# Patient Record
Sex: FEMALE | Race: WHITE | NOT HISPANIC OR LATINO | Employment: OTHER | URBAN - METROPOLITAN AREA
[De-identification: names, ages, dates, MRNs, and addresses within clinical notes are randomized per-mention and may not be internally consistent; named-entity substitution may affect disease eponyms.]

---

## 2023-06-01 ENCOUNTER — TELEPHONE (OUTPATIENT)
Dept: PRIMARY CARE | Facility: CLINIC | Age: 63
End: 2023-06-01
Payer: MEDICARE

## 2023-06-01 NOTE — TELEPHONE ENCOUNTER
Pt is calling because she states she was told to call back when she needed refills on her meds    Rx Refill Request Telephone Encounter    Name:  Gemini Butler  : 1960     Medication Name:  atorvastatin  Dose (Optional):    40mg  Quantity (Optional):    #90  Directions (Optional):   take 1 tablet by mouth every day     ALLERGIES:   nkda    Specific Pharmacy location:  [facility]    Date of last appointment:  22  Date of next appointment:  none    Best number to reach patient:  [phone]    Rx Refill Request Telephone Encounter    Name:  Gemini Butler  : 1960     Medication Name:  omeprazole  Dose (Optional):    40 mg(not 20 mg)  Quantity (Optional):    #90  Directions (Optional):   take 1 capsule by mouth daily    ALLERGIES:   nkda     Specific Pharmacy location:  [facility]    Date of last appointment:  22  Date of next appointment:  none    Best number to reach patient:  [phone]

## 2023-06-02 NOTE — TELEPHONE ENCOUNTER
Pt made appointment for 6/7/23. She was driving so she will call back to give us information on a local pharmacy she needs a refill sent to

## 2023-06-06 PROBLEM — E06.3 HYPOTHYROIDISM DUE TO HASHIMOTO'S THYROIDITIS: Status: ACTIVE | Noted: 2023-06-06

## 2023-06-06 PROBLEM — E89.2 S/P PARATHYROIDECTOMY (CMS/HCC): Status: ACTIVE | Noted: 2023-06-06

## 2023-06-06 PROBLEM — K21.9 REFLUX LARYNGITIS: Status: ACTIVE | Noted: 2023-06-06

## 2023-06-06 PROBLEM — E83.52 HYPERCALCEMIA: Status: ACTIVE | Noted: 2023-06-06

## 2023-06-06 PROBLEM — M54.12 RADICULOPATHY, CERVICAL: Status: ACTIVE | Noted: 2023-06-06

## 2023-06-06 PROBLEM — R53.83 FATIGUE: Status: ACTIVE | Noted: 2023-06-06

## 2023-06-06 PROBLEM — R19.7 DIARRHEA: Status: ACTIVE | Noted: 2023-06-06

## 2023-06-06 PROBLEM — M54.2 NECK PAIN, CHRONIC: Status: ACTIVE | Noted: 2023-06-06

## 2023-06-06 PROBLEM — R59.9 LYMPH NODES ENLARGED: Status: ACTIVE | Noted: 2023-06-06

## 2023-06-06 PROBLEM — R12 HEARTBURN: Status: ACTIVE | Noted: 2023-06-06

## 2023-06-06 PROBLEM — R07.0 THROAT PAIN: Status: ACTIVE | Noted: 2023-06-06

## 2023-06-06 PROBLEM — R20.0 NUMBNESS IN BOTH HANDS: Status: ACTIVE | Noted: 2023-06-06

## 2023-06-06 PROBLEM — R06.02 SHORTNESS OF BREATH ON EXERTION: Status: ACTIVE | Noted: 2023-06-06

## 2023-06-06 PROBLEM — M79.7 FIBROMYALGIA: Status: ACTIVE | Noted: 2023-06-06

## 2023-06-06 PROBLEM — D22.9 MELANOCYTIC NEVUS: Status: ACTIVE | Noted: 2023-06-06

## 2023-06-06 PROBLEM — R13.10 DYSPHAGIA: Status: ACTIVE | Noted: 2023-06-06

## 2023-06-06 PROBLEM — R87.612 LOW GRADE SQUAMOUS INTRAEPITHELIAL LESION (LGSIL) AT RISK FOR HIGH GRADE SQUAMOUS INTRAEPITHELIAL LESION (HGSIL) ON CYTOLOGIC SMEAR OF CERVIX: Status: ACTIVE | Noted: 2023-06-06

## 2023-06-06 PROBLEM — L98.9 SKIN DISORDER: Status: ACTIVE | Noted: 2023-06-06

## 2023-06-06 PROBLEM — G89.29 NECK PAIN, CHRONIC: Status: ACTIVE | Noted: 2023-06-06

## 2023-06-06 PROBLEM — N87.0 CIN I (CERVICAL INTRAEPITHELIAL NEOPLASIA I): Status: ACTIVE | Noted: 2023-06-06

## 2023-06-06 PROBLEM — E21.0 PRIMARY HYPERPARATHYROIDISM (MULTI): Status: ACTIVE | Noted: 2023-06-06

## 2023-06-06 PROBLEM — J39.2 THROAT IRRITATION: Status: ACTIVE | Noted: 2023-06-06

## 2023-06-06 PROBLEM — J04.0 REFLUX LARYNGITIS: Status: ACTIVE | Noted: 2023-06-06

## 2023-06-06 PROBLEM — K59.00 CONSTIPATION: Status: ACTIVE | Noted: 2023-06-06

## 2023-06-06 PROBLEM — G62.9 PERIPHERAL POLYNEUROPATHY: Status: ACTIVE | Noted: 2023-06-06

## 2023-06-06 PROBLEM — B97.7 HPV IN FEMALE: Status: ACTIVE | Noted: 2023-06-06

## 2023-06-06 PROBLEM — E03.8 HYPOTHYROIDISM DUE TO HASHIMOTO'S THYROIDITIS: Status: ACTIVE | Noted: 2023-06-06

## 2023-06-06 PROBLEM — R20.9 SENSORY DISTURBANCE: Status: ACTIVE | Noted: 2023-06-06

## 2023-06-06 PROBLEM — E78.5 HYPERLIPIDEMIA: Status: ACTIVE | Noted: 2023-06-06

## 2023-06-06 PROBLEM — I49.9 ARRHYTHMIA: Status: ACTIVE | Noted: 2023-06-06

## 2023-06-06 PROBLEM — E55.9 VITAMIN D DEFICIENCY: Status: ACTIVE | Noted: 2023-06-06

## 2023-06-06 PROBLEM — N95.1 HOT FLASHES, MENOPAUSAL: Status: ACTIVE | Noted: 2023-06-06

## 2023-06-06 PROBLEM — K64.9 HEMORRHOIDS: Status: ACTIVE | Noted: 2023-06-06

## 2023-06-06 RX ORDER — ERGOCALCIFEROL 1.25 MG/1
1 CAPSULE ORAL
COMMUNITY
Start: 2018-08-13 | End: 2023-12-13 | Stop reason: WASHOUT

## 2023-06-06 RX ORDER — DULOXETIN HYDROCHLORIDE 60 MG/1
CAPSULE, DELAYED RELEASE ORAL
COMMUNITY
End: 2023-12-13 | Stop reason: WASHOUT

## 2023-06-06 RX ORDER — ATORVASTATIN CALCIUM 40 MG/1
TABLET, FILM COATED ORAL
COMMUNITY
Start: 2023-02-25 | End: 2024-06-06 | Stop reason: SDUPTHER

## 2023-06-06 RX ORDER — LEVOTHYROXINE SODIUM 75 UG/1
TABLET ORAL
COMMUNITY
Start: 2023-02-25 | End: 2023-08-31 | Stop reason: SDUPTHER

## 2023-06-06 RX ORDER — ALBUTEROL SULFATE 90 UG/1
AEROSOL, METERED RESPIRATORY (INHALATION)
COMMUNITY
Start: 2022-11-22 | End: 2023-12-13 | Stop reason: WASHOUT

## 2023-06-06 RX ORDER — ROSUVASTATIN CALCIUM 10 MG/1
TABLET, COATED ORAL
COMMUNITY
Start: 2022-07-27 | End: 2023-12-13 | Stop reason: WASHOUT

## 2023-06-06 RX ORDER — ACETAMINOPHEN 500 MG
1 TABLET ORAL DAILY
COMMUNITY

## 2023-06-06 RX ORDER — OMEPRAZOLE 20 MG/1
CAPSULE, DELAYED RELEASE ORAL
COMMUNITY
Start: 2023-03-30

## 2023-06-07 ENCOUNTER — OFFICE VISIT (OUTPATIENT)
Dept: PRIMARY CARE | Facility: CLINIC | Age: 63
End: 2023-06-07
Payer: MEDICARE

## 2023-06-07 VITALS
DIASTOLIC BLOOD PRESSURE: 78 MMHG | RESPIRATION RATE: 16 BRPM | WEIGHT: 178.6 LBS | BODY MASS INDEX: 28.7 KG/M2 | HEIGHT: 66 IN | SYSTOLIC BLOOD PRESSURE: 120 MMHG | OXYGEN SATURATION: 96 % | HEART RATE: 67 BPM | TEMPERATURE: 97.2 F

## 2023-06-07 DIAGNOSIS — E78.2 MIXED HYPERLIPIDEMIA: ICD-10-CM

## 2023-06-07 DIAGNOSIS — K21.9 GASTROESOPHAGEAL REFLUX DISEASE WITHOUT ESOPHAGITIS: ICD-10-CM

## 2023-06-07 DIAGNOSIS — E06.3 HYPOTHYROIDISM DUE TO HASHIMOTO'S THYROIDITIS: ICD-10-CM

## 2023-06-07 DIAGNOSIS — E03.8 HYPOTHYROIDISM DUE TO HASHIMOTO'S THYROIDITIS: ICD-10-CM

## 2023-06-07 DIAGNOSIS — Z00.00 MEDICARE ANNUAL WELLNESS VISIT, SUBSEQUENT: Primary | ICD-10-CM

## 2023-06-07 PROCEDURE — 1159F MED LIST DOCD IN RCRD: CPT | Performed by: FAMILY MEDICINE

## 2023-06-07 PROCEDURE — G0439 PPPS, SUBSEQ VISIT: HCPCS | Performed by: FAMILY MEDICINE

## 2023-06-07 PROCEDURE — 1170F FXNL STATUS ASSESSED: CPT | Performed by: FAMILY MEDICINE

## 2023-06-07 PROCEDURE — 1036F TOBACCO NON-USER: CPT | Performed by: FAMILY MEDICINE

## 2023-06-07 RX ORDER — HYDROCORTISONE 1 %
GEL (GRAM) TOPICAL
COMMUNITY

## 2023-06-07 ASSESSMENT — PATIENT HEALTH QUESTIONNAIRE - PHQ9
SUM OF ALL RESPONSES TO PHQ9 QUESTIONS 1 AND 2: 0
2. FEELING DOWN, DEPRESSED OR HOPELESS: NOT AT ALL
1. LITTLE INTEREST OR PLEASURE IN DOING THINGS: NOT AT ALL

## 2023-06-07 ASSESSMENT — ACTIVITIES OF DAILY LIVING (ADL)
DRESSING: INDEPENDENT
BATHING: INDEPENDENT
GROCERY_SHOPPING: INDEPENDENT
DOING_HOUSEWORK: INDEPENDENT
TAKING_MEDICATION: INDEPENDENT
MANAGING_FINANCES: INDEPENDENT

## 2023-06-07 ASSESSMENT — ENCOUNTER SYMPTOMS
DEPRESSION: 0
LOSS OF SENSATION IN FEET: 0
OCCASIONAL FEELINGS OF UNSTEADINESS: 0

## 2023-06-07 NOTE — PROGRESS NOTES
"Subjective   Patient ID: Gemini Butler is a 63 y.o. female who presents for Medicare Annual Wellness Visit Subsequent, Hyperlipidemia, and Hypothyroidism.    HPI    AWV    Hypothyroidism  Taking Synthroid 75 mcg  Taking on an empty stomach  No significant weight loss/gain  No heat/cold intolerances  No increase in hair loss/dry skin  She does notice a difference if she doesn't take this regularly.    Hyperlipidemia follow up  Denies chest pain,SOB  Denies any swelling, headaches, lightheadedness or dizziness  Eats a generally healthy diet  Exercises  Currently taking Lipitor.    She is taking Omeprazole also.  Her acid has been well controlled with 20 mg.    No other concern      Review of systems  ; Patient seen today for exam denies any problems with headaches or vision, denies any shortness of breath chest pain nausea or vomiting, no black stool no blood in the stool no heartburn type symptoms denies any problems with constipation or diarrhea, and no dysuria-type symptoms    The patient's allergies medications were reviewed with them today    The patient's social family and surgical history or also reviewed here today, along with her past medical history.     Objective     Alert and active in  no acute distress  HEENT TMs clear oropharynx negative nares clear no drainage noted neck supple  With no adenopathy   Heart regular rate and rhythm without murmur and no carotid bruits  Lungs- clear to auscultation bilaterally, no wheeze or rhonchi noted  Thyroid -negative masses or nodularity  Abdomen- soft times four quadrants , bowel sounds positive no masses or organomegaly, negative tenderness guarding or rebound  Neurological exam unremarkable- DTRs in upper and lower extremities within normal limits.   skin -no lesions noted      /78 (BP Location: Right arm, Patient Position: Sitting, BP Cuff Size: Adult)   Pulse 67   Temp 36.2 °C (97.2 °F) (Temporal)   Resp 16   Ht 1.676 m (5' 6\")   Wt 81 kg (178 lb " 9.6 oz)   SpO2 96%   BMI 28.83 kg/m²     No Known Allergies    Assessment/Plan   Problem List Items Addressed This Visit          Endocrine/Metabolic    Hypothyroidism due to Hashimoto's thyroiditis       Other    Hyperlipidemia     Other Visit Diagnoses       Medicare annual wellness visit, subsequent    -  Primary    Gastroesophageal reflux disease without esophagitis              Medicare wellness questionnaire reviewed in detail. Advanced Directives reviewed today. Patient advised to provide the office with a copy if has not already done so. No problems with activities of daily living. Falls risks reviewed.    She can take CoQ10 if she would like to.    Discussed Cologuard with her today.    Discussed Prevnar 20 with her today.  Refuses Prevnar 20 at this time.    If anything worsens or changes please call us at once, follow up in the office as planned.    Scribe Attestation  By signing my name below, I, Inna Campbell MA, Scribe   attest that this documentation has been prepared under the direction and in the presence of Deo Martinez DO.

## 2023-08-31 DIAGNOSIS — E03.8 HYPOTHYROIDISM DUE TO HASHIMOTO'S THYROIDITIS: ICD-10-CM

## 2023-08-31 DIAGNOSIS — E06.3 HYPOTHYROIDISM DUE TO HASHIMOTO'S THYROIDITIS: ICD-10-CM

## 2023-08-31 RX ORDER — LEVOTHYROXINE SODIUM 75 UG/1
75 TABLET ORAL
Qty: 90 TABLET | Refills: 1 | Status: SHIPPED | OUTPATIENT
Start: 2023-08-31 | End: 2023-12-13 | Stop reason: SDUPTHER

## 2023-08-31 NOTE — TELEPHONE ENCOUNTER
PATIENT IS CALLING FOR A REFILL ON:     LEVOTHYROXINE 75 MCG - 1 TABLET DAILY    [facility]    [phone]    LAST OV: 6/7/23

## 2023-11-15 ENCOUNTER — TELEPHONE (OUTPATIENT)
Dept: PRIMARY CARE | Facility: CLINIC | Age: 63
End: 2023-11-15
Payer: MEDICARE

## 2023-11-15 NOTE — TELEPHONE ENCOUNTER
Rx Refill Request Telephone Encounter    Name:  Gemini Butler  : 1960     Medication Name:  levothyroxine (Synthroid) 75 mcg tablet   Quantity (Optional):    90 tablet   Directions (Optional):   Take 1 tablet (75 mcg) by mouth once daily in the morning. Take before meals.     Specific Pharmacy location:    [facility] [place]       Date of last appointment:  23

## 2023-11-15 NOTE — TELEPHONE ENCOUNTER
We can send a 30 day locally, but she is due for her 6 month with Dr Martinez.    Please assist pt to schedule this and thank you!

## 2023-12-12 NOTE — PROGRESS NOTES
Subjective   Patient ID: Gemini Butler is a 63 y.o. female who presents for Hyperlipidemia, Hypothyroidism, Med Refill, and Anxiety.  HPI  Hypothyroidism  Taking Synthroid 75 mcg  Taking on an empty stomach  No significant weight loss/gain  No heat/cold intolerances  No increase in hair loss/dry skin  She does notice a difference if she doesn't take this regularly.     Hyperlipidemia follow up  Denies chest pain, SOB at times  Denies any swelling, headaches, lightheadedness or dizziness  Eats a generally healthy diet  Exercises  Currently taking Lipitor.     Patient admits to experiencing increased anxiety at times  Patient states this is episodic and it feels like a panic attack  She states when this happens her body feels overwhelmed and feels a shock throughout her body     She is taking Omeprazole also.  Her acid has been well controlled with 20 mg.    Flu declined     No other concern  Review of systems  ; Patient seen today for exam denies any problems with headaches or vision, denies any shortness of breath chest pain nausea or vomiting, no black stool no blood in the stool no heartburn type symptoms denies any problems with constipation or diarrhea, and no dysuria-type symptoms    The patient's allergies medications were reviewed with them today    The patient's social family and surgical history or also reviewed here today, along with her past medical history.     Objective     Alert and active in  no acute distress  HEENT TMs clear oropharynx negative nares clear no drainage noted neck supple  With no adenopathy   Heart regular rate and rhythm without murmur and no carotid bruits  Lungs- clear to auscultation bilaterally, no wheeze or rhonchi noted  Thyroid -negative masses or nodularity  Abdomen- soft times four quadrants , bowel sounds positive no masses or organomegaly, negative tenderness guarding or rebound  Neurological exam unremarkable- DTRs in upper and lower extremities within normal limits.  "  skin -no lesions noted      /72 (BP Location: Left arm, Patient Position: Sitting, BP Cuff Size: Adult)   Pulse 63   Temp 36.4 °C (97.5 °F) (Temporal)   Ht 1.702 m (5' 7\")   Wt 79.5 kg (175 lb 3.2 oz)   SpO2 98%   BMI 27.44 kg/m²     No Known Allergies    Assessment/Plan   Problem List Items Addressed This Visit       Arrhythmia    Hyperlipidemia    Relevant Orders    CBC and Auto Differential    Comprehensive Metabolic Panel    Lipid Panel    Hypothyroidism due to Hashimoto's thyroiditis    Relevant Medications    levothyroxine (Synthroid) 75 mcg tablet    Other Relevant Orders    Thyroid Stimulating Hormone    Thyroxine, Free    Primary hyperparathyroidism (CMS/HCC)    Shortness of breath on exertion    Vitamin D deficiency    Relevant Orders    Vitamin D 25-Hydroxy,Total (for eval of Vitamin D levels)     Other Visit Diagnoses       Encounter for screening mammogram for malignant neoplasm of breast        Relevant Orders    BI mammo bilateral screening tomosynthesis    Screen for colon cancer        Relevant Orders    Cologuard® colon cancer screening              If anything worsens or changes please call us at once, follow up in the office as planned,     "

## 2023-12-13 ENCOUNTER — APPOINTMENT (OUTPATIENT)
Dept: LAB | Facility: LAB | Age: 63
End: 2023-12-13
Payer: MEDICARE

## 2023-12-13 ENCOUNTER — OFFICE VISIT (OUTPATIENT)
Dept: PRIMARY CARE | Facility: CLINIC | Age: 63
End: 2023-12-13
Payer: MEDICARE

## 2023-12-13 VITALS
TEMPERATURE: 97.5 F | BODY MASS INDEX: 27.5 KG/M2 | HEIGHT: 67 IN | OXYGEN SATURATION: 98 % | WEIGHT: 175.2 LBS | HEART RATE: 63 BPM | DIASTOLIC BLOOD PRESSURE: 72 MMHG | SYSTOLIC BLOOD PRESSURE: 124 MMHG

## 2023-12-13 DIAGNOSIS — E03.8 HYPOTHYROIDISM DUE TO HASHIMOTO'S THYROIDITIS: ICD-10-CM

## 2023-12-13 DIAGNOSIS — E06.3 HYPOTHYROIDISM DUE TO HASHIMOTO'S THYROIDITIS: ICD-10-CM

## 2023-12-13 DIAGNOSIS — E78.2 MIXED HYPERLIPIDEMIA: ICD-10-CM

## 2023-12-13 DIAGNOSIS — I49.9 CARDIAC ARRHYTHMIA, UNSPECIFIED CARDIAC ARRHYTHMIA TYPE: ICD-10-CM

## 2023-12-13 DIAGNOSIS — E55.9 VITAMIN D DEFICIENCY: ICD-10-CM

## 2023-12-13 DIAGNOSIS — Z12.31 ENCOUNTER FOR SCREENING MAMMOGRAM FOR MALIGNANT NEOPLASM OF BREAST: ICD-10-CM

## 2023-12-13 DIAGNOSIS — E21.0 PRIMARY HYPERPARATHYROIDISM (MULTI): ICD-10-CM

## 2023-12-13 DIAGNOSIS — Z12.11 SCREEN FOR COLON CANCER: ICD-10-CM

## 2023-12-13 DIAGNOSIS — R06.02 SHORTNESS OF BREATH ON EXERTION: ICD-10-CM

## 2023-12-13 PROCEDURE — 1159F MED LIST DOCD IN RCRD: CPT | Performed by: FAMILY MEDICINE

## 2023-12-13 PROCEDURE — 1036F TOBACCO NON-USER: CPT | Performed by: FAMILY MEDICINE

## 2023-12-13 PROCEDURE — 99214 OFFICE O/P EST MOD 30 MIN: CPT | Performed by: FAMILY MEDICINE

## 2023-12-13 RX ORDER — MV-MIN/VIT C/GLUT/LYSINE/HB124 250-12.5MG
TABLET,CHEWABLE ORAL
COMMUNITY

## 2023-12-13 RX ORDER — LEVOTHYROXINE SODIUM 75 UG/1
75 TABLET ORAL
Qty: 90 TABLET | Refills: 1 | Status: SHIPPED | OUTPATIENT
Start: 2023-12-13 | End: 2024-06-06 | Stop reason: SDUPTHER

## 2023-12-13 NOTE — PROGRESS NOTES
Subjective   Patient ID: Gemini Butler is a 63 y.o. female who presents for Hyperlipidemia, Hypothyroidism, Med Refill, and Anxiety.    Hyperlipidemia    Med Refill    Anxiety        Doing well with her thyroid needs refill today is watching her cholesterol closely      At times her anxiety gets worse but we discussed that this time she will hold off on any medications as she dislikes to have fun others due to psych to find out the day sometimes  Hypothyroidism  Taking Synthroid 75 mcg  Taking on an empty stomach  No significant weight loss/gain  No heat/cold intolerances  No increase in hair loss/dry skin  She does notice a difference if she doesn't take this regularly.     Hyperlipidemia follow up  Denies chest pain, SOB at times  Denies any swelling, headaches, lightheadedness or dizziness  Eats a generally healthy diet  Exercises  Currently taking Lipitor.     Patient admits to experiencing increased anxiety at times  Patient states this is episodic and it feels like a panic attack  She states when this happens her body feels overwhelmed and feels a shock throughout her body     She is taking Omeprazole also.  Her acid has been well controlled with 20 mg.  Has been taking it 4 days weekly.    Flu declined     No other concern  Review of systems  ; Patient seen today for exam denies any problems with headaches or vision, denies any shortness of breath chest pain nausea or vomiting, no black stool no blood in the stool no heartburn type symptoms denies any problems with constipation or diarrhea, and no dysuria-type symptoms    The patient's allergies medications were reviewed with them today    The patient's social family and surgical history or also reviewed here today, along with her past medical history.     Objective     Alert and active in  no acute distress  HEENT TMs clear oropharynx negative nares clear no drainage noted neck supple  With no adenopathy   Heart regular rate and rhythm without murmur and  "no carotid bruits  Lungs- clear to auscultation bilaterally, no wheeze or rhonchi noted  Thyroid -negative masses or nodularity  Abdomen- soft times four quadrants , bowel sounds positive no masses or organomegaly, negative tenderness guarding or rebound  Neurological exam unremarkable- DTRs in upper and lower extremities within normal limits.   skin -no lesions noted      /72 (BP Location: Left arm, Patient Position: Sitting, BP Cuff Size: Adult)   Pulse 63   Temp 36.4 °C (97.5 °F) (Temporal)   Ht 1.702 m (5' 7\")   Wt 79.5 kg (175 lb 3.2 oz)   SpO2 98%   BMI 27.44 kg/m²     No Known Allergies    Assessment/Plan   Problem List Items Addressed This Visit       Arrhythmia    Hyperlipidemia    Relevant Orders    CBC and Auto Differential    Comprehensive Metabolic Panel    Lipid Panel    Hypothyroidism due to Hashimoto's thyroiditis    Relevant Medications    levothyroxine (Synthroid) 75 mcg tablet    Other Relevant Orders    Thyroid Stimulating Hormone    Thyroxine, Free    Primary hyperparathyroidism (CMS/HCC)    Shortness of breath on exertion    Vitamin D deficiency    Relevant Orders    Vitamin D 25-Hydroxy,Total (for eval of Vitamin D levels)     Other Visit Diagnoses       Encounter for screening mammogram for malignant neoplasm of breast        Relevant Orders    BI mammo bilateral screening tomosynthesis    Screen for colon cancer        Relevant Orders    Cologuard® colon cancer screening          Refill Levothyroxine.     Mammogram ordered.    Refused Prevnar 20 AGAINST MEDICAL ADVICE.    Cologuard ordered.    Recommend a multivitamin daily.    If her anxiety is causing her problems with her relationships, her friendships, her work, then we recommend she start a medication.    Labs have been ordered, she/he will have these performed and we will contact her/him with results.  (CBC, CMP, Lipid, TSH, T4, Vitamin D)    If anything worsens or changes please call us at once, follow up in the office as " planned.    Scribe Attestation  By signing my name below, I, Inna Campbell MA , Scribtrina   attest that this documentation has been prepared under the direction and in the presence of Deo Martinez DO.

## 2023-12-14 ENCOUNTER — LAB (OUTPATIENT)
Dept: LAB | Facility: LAB | Age: 63
End: 2023-12-14
Payer: MEDICARE

## 2023-12-14 DIAGNOSIS — E06.3 HYPOTHYROIDISM DUE TO HASHIMOTO'S THYROIDITIS: ICD-10-CM

## 2023-12-14 DIAGNOSIS — E03.8 HYPOTHYROIDISM DUE TO HASHIMOTO'S THYROIDITIS: ICD-10-CM

## 2023-12-14 DIAGNOSIS — E78.2 MIXED HYPERLIPIDEMIA: ICD-10-CM

## 2023-12-14 DIAGNOSIS — E55.9 VITAMIN D DEFICIENCY: ICD-10-CM

## 2023-12-14 LAB
25(OH)D3 SERPL-MCNC: 29 NG/ML (ref 30–100)
ALBUMIN SERPL BCP-MCNC: 4.1 G/DL (ref 3.4–5)
ALP SERPL-CCNC: 76 U/L (ref 33–136)
ALT SERPL W P-5'-P-CCNC: 17 U/L (ref 7–45)
ANION GAP SERPL CALC-SCNC: 11 MMOL/L (ref 10–20)
AST SERPL W P-5'-P-CCNC: 17 U/L (ref 9–39)
BASOPHILS # BLD AUTO: 0.07 X10*3/UL (ref 0–0.1)
BASOPHILS NFR BLD AUTO: 1.3 %
BILIRUB SERPL-MCNC: 0.4 MG/DL (ref 0–1.2)
BUN SERPL-MCNC: 15 MG/DL (ref 6–23)
CALCIUM SERPL-MCNC: 9 MG/DL (ref 8.6–10.3)
CHLORIDE SERPL-SCNC: 104 MMOL/L (ref 98–107)
CHOLEST SERPL-MCNC: 156 MG/DL (ref 0–199)
CHOLESTEROL/HDL RATIO: 3.2
CO2 SERPL-SCNC: 29 MMOL/L (ref 21–32)
CREAT SERPL-MCNC: 0.83 MG/DL (ref 0.5–1.05)
EOSINOPHIL # BLD AUTO: 0.15 X10*3/UL (ref 0–0.7)
EOSINOPHIL NFR BLD AUTO: 2.7 %
ERYTHROCYTE [DISTWIDTH] IN BLOOD BY AUTOMATED COUNT: 11.7 % (ref 11.5–14.5)
GFR SERPL CREATININE-BSD FRML MDRD: 78 ML/MIN/1.73M*2
GLUCOSE SERPL-MCNC: 92 MG/DL (ref 74–99)
HCT VFR BLD AUTO: 40.6 % (ref 36–46)
HDLC SERPL-MCNC: 49 MG/DL
HGB BLD-MCNC: 13.3 G/DL (ref 12–16)
IMM GRANULOCYTES # BLD AUTO: 0.01 X10*3/UL (ref 0–0.7)
IMM GRANULOCYTES NFR BLD AUTO: 0.2 % (ref 0–0.9)
LDLC SERPL CALC-MCNC: 77 MG/DL
LYMPHOCYTES # BLD AUTO: 2.7 X10*3/UL (ref 1.2–4.8)
LYMPHOCYTES NFR BLD AUTO: 49.5 %
MCH RBC QN AUTO: 30.6 PG (ref 26–34)
MCHC RBC AUTO-ENTMCNC: 32.8 G/DL (ref 32–36)
MCV RBC AUTO: 94 FL (ref 80–100)
MONOCYTES # BLD AUTO: 0.44 X10*3/UL (ref 0.1–1)
MONOCYTES NFR BLD AUTO: 8.1 %
NEUTROPHILS # BLD AUTO: 2.09 X10*3/UL (ref 1.2–7.7)
NEUTROPHILS NFR BLD AUTO: 38.2 %
NON HDL CHOLESTEROL: 107 MG/DL (ref 0–149)
NRBC BLD-RTO: 0 /100 WBCS (ref 0–0)
PLATELET # BLD AUTO: 244 X10*3/UL (ref 150–450)
POTASSIUM SERPL-SCNC: 4.1 MMOL/L (ref 3.5–5.3)
PROT SERPL-MCNC: 6.6 G/DL (ref 6.4–8.2)
RBC # BLD AUTO: 4.34 X10*6/UL (ref 4–5.2)
SODIUM SERPL-SCNC: 140 MMOL/L (ref 136–145)
T4 FREE SERPL-MCNC: 0.83 NG/DL (ref 0.61–1.12)
TRIGL SERPL-MCNC: 149 MG/DL (ref 0–149)
TSH SERPL-ACNC: 2.18 MIU/L (ref 0.44–3.98)
VLDL: 30 MG/DL (ref 0–40)
WBC # BLD AUTO: 5.5 X10*3/UL (ref 4.4–11.3)

## 2023-12-14 PROCEDURE — 82306 VITAMIN D 25 HYDROXY: CPT

## 2023-12-14 PROCEDURE — 84439 ASSAY OF FREE THYROXINE: CPT

## 2023-12-14 PROCEDURE — 80053 COMPREHEN METABOLIC PANEL: CPT

## 2023-12-14 PROCEDURE — 85025 COMPLETE CBC W/AUTO DIFF WBC: CPT

## 2023-12-14 PROCEDURE — 84443 ASSAY THYROID STIM HORMONE: CPT

## 2023-12-14 PROCEDURE — 80061 LIPID PANEL: CPT

## 2023-12-14 PROCEDURE — 36415 COLL VENOUS BLD VENIPUNCTURE: CPT

## 2023-12-18 ENCOUNTER — TELEPHONE (OUTPATIENT)
Dept: PRIMARY CARE | Facility: CLINIC | Age: 63
End: 2023-12-18
Payer: MEDICARE

## 2023-12-18 NOTE — TELEPHONE ENCOUNTER
----- Message from Deo Martinez DO sent at 12/15/2023  5:54 PM EST -----  Her labs all look great including cholesterol and sugar vitamin D is slightly low make sure she is taking at least 2000 vitamin D3 each day if she is then she needs to take 3000/day, increase it by 50%

## 2024-01-02 LAB — NONINV COLON CA DNA+OCC BLD SCRN STL QL: NEGATIVE

## 2024-01-03 ENCOUNTER — TELEPHONE (OUTPATIENT)
Dept: PRIMARY CARE | Facility: CLINIC | Age: 64
End: 2024-01-03
Payer: MEDICARE

## 2024-01-03 NOTE — TELEPHONE ENCOUNTER
----- Message from Deo Martinez DO sent at 1/3/2024  7:45 AM EST -----  Cologuard stool test is negative, repeat in 3 years

## 2024-06-06 ENCOUNTER — TELEPHONE (OUTPATIENT)
Dept: PRIMARY CARE | Facility: CLINIC | Age: 64
End: 2024-06-06

## 2024-06-06 DIAGNOSIS — E03.8 HYPOTHYROIDISM DUE TO HASHIMOTO'S THYROIDITIS: ICD-10-CM

## 2024-06-06 DIAGNOSIS — E78.2 MIXED HYPERLIPIDEMIA: ICD-10-CM

## 2024-06-06 DIAGNOSIS — K21.9 GASTROESOPHAGEAL REFLUX DISEASE WITHOUT ESOPHAGITIS: ICD-10-CM

## 2024-06-06 DIAGNOSIS — E06.3 HYPOTHYROIDISM DUE TO HASHIMOTO'S THYROIDITIS: ICD-10-CM

## 2024-06-06 RX ORDER — OMEPRAZOLE 40 MG/1
40 CAPSULE, DELAYED RELEASE ORAL
Qty: 90 CAPSULE | Refills: 1 | Status: SHIPPED | OUTPATIENT
Start: 2024-06-06

## 2024-06-06 RX ORDER — LEVOTHYROXINE SODIUM 75 UG/1
75 TABLET ORAL
Qty: 90 TABLET | Refills: 1 | Status: SHIPPED | OUTPATIENT
Start: 2024-06-06

## 2024-06-06 RX ORDER — ATORVASTATIN CALCIUM 40 MG/1
TABLET, FILM COATED ORAL
Qty: 90 TABLET | Refills: 1 | Status: SHIPPED | OUTPATIENT
Start: 2024-06-06

## 2024-06-06 NOTE — TELEPHONE ENCOUNTER
Medication(s) pended for Dr. Martinez    Patient is requesting Omeprazole to be 40 mg 1 every day in place of 20 mg bid     Patient is requesting a year of refills. Please advise

## 2024-07-05 ENCOUNTER — APPOINTMENT (OUTPATIENT)
Dept: PRIMARY CARE | Facility: CLINIC | Age: 64
End: 2024-07-05
Payer: MEDICARE

## 2024-07-11 ENCOUNTER — APPOINTMENT (OUTPATIENT)
Dept: PRIMARY CARE | Facility: CLINIC | Age: 64
End: 2024-07-11
Payer: MEDICARE

## 2024-07-11 VITALS
RESPIRATION RATE: 16 BRPM | TEMPERATURE: 96.9 F | WEIGHT: 177 LBS | HEIGHT: 67 IN | DIASTOLIC BLOOD PRESSURE: 72 MMHG | HEART RATE: 78 BPM | OXYGEN SATURATION: 98 % | SYSTOLIC BLOOD PRESSURE: 116 MMHG | BODY MASS INDEX: 27.78 KG/M2

## 2024-07-11 DIAGNOSIS — E06.3 HYPOTHYROIDISM DUE TO HASHIMOTO'S THYROIDITIS: ICD-10-CM

## 2024-07-11 DIAGNOSIS — E03.8 HYPOTHYROIDISM DUE TO HASHIMOTO'S THYROIDITIS: ICD-10-CM

## 2024-07-11 DIAGNOSIS — E21.0 PRIMARY HYPERPARATHYROIDISM (MULTI): ICD-10-CM

## 2024-07-11 DIAGNOSIS — R21 RASH: ICD-10-CM

## 2024-07-11 DIAGNOSIS — Z00.00 MEDICARE ANNUAL WELLNESS VISIT, SUBSEQUENT: Primary | ICD-10-CM

## 2024-07-11 DIAGNOSIS — E78.2 MIXED HYPERLIPIDEMIA: ICD-10-CM

## 2024-07-11 DIAGNOSIS — R06.02 SOB (SHORTNESS OF BREATH): ICD-10-CM

## 2024-07-11 DIAGNOSIS — M79.7 FIBROMYALGIA: ICD-10-CM

## 2024-07-11 DIAGNOSIS — E55.9 VITAMIN D DEFICIENCY: ICD-10-CM

## 2024-07-11 DIAGNOSIS — R12 HEARTBURN: ICD-10-CM

## 2024-07-11 PROCEDURE — 1159F MED LIST DOCD IN RCRD: CPT | Performed by: FAMILY MEDICINE

## 2024-07-11 PROCEDURE — 1157F ADVNC CARE PLAN IN RCRD: CPT | Performed by: FAMILY MEDICINE

## 2024-07-11 PROCEDURE — 1160F RVW MEDS BY RX/DR IN RCRD: CPT | Performed by: FAMILY MEDICINE

## 2024-07-11 PROCEDURE — 99213 OFFICE O/P EST LOW 20 MIN: CPT | Performed by: FAMILY MEDICINE

## 2024-07-11 PROCEDURE — 1036F TOBACCO NON-USER: CPT | Performed by: FAMILY MEDICINE

## 2024-07-11 PROCEDURE — 1158F ADVNC CARE PLAN TLK DOCD: CPT | Performed by: FAMILY MEDICINE

## 2024-07-11 PROCEDURE — 3008F BODY MASS INDEX DOCD: CPT | Performed by: FAMILY MEDICINE

## 2024-07-11 PROCEDURE — G0439 PPPS, SUBSEQ VISIT: HCPCS | Performed by: FAMILY MEDICINE

## 2024-07-11 PROCEDURE — 1123F ACP DISCUSS/DSCN MKR DOCD: CPT | Performed by: FAMILY MEDICINE

## 2024-07-11 PROCEDURE — 1170F FXNL STATUS ASSESSED: CPT | Performed by: FAMILY MEDICINE

## 2024-07-11 RX ORDER — HYDROCORTISONE 25 MG/G
CREAM TOPICAL 2 TIMES DAILY PRN
Qty: 20 G | Refills: 1 | Status: CANCELLED | OUTPATIENT
Start: 2024-07-11

## 2024-07-11 ASSESSMENT — ACTIVITIES OF DAILY LIVING (ADL)
DRESSING: INDEPENDENT
TAKING_MEDICATION: INDEPENDENT
BATHING: INDEPENDENT
GROCERY_SHOPPING: INDEPENDENT
DOING_HOUSEWORK: INDEPENDENT
MANAGING_FINANCES: INDEPENDENT

## 2024-07-11 ASSESSMENT — PATIENT HEALTH QUESTIONNAIRE - PHQ9
SUM OF ALL RESPONSES TO PHQ9 QUESTIONS 1 AND 2: 0
1. LITTLE INTEREST OR PLEASURE IN DOING THINGS: NOT AT ALL
1. LITTLE INTEREST OR PLEASURE IN DOING THINGS: NOT AT ALL
SUM OF ALL RESPONSES TO PHQ9 QUESTIONS 1 AND 2: 0
2. FEELING DOWN, DEPRESSED OR HOPELESS: NOT AT ALL
2. FEELING DOWN, DEPRESSED OR HOPELESS: NOT AT ALL

## 2024-07-11 NOTE — PROGRESS NOTES
Subjective   Patient ID: Gemini Butler is a 64 y.o. female who presents for Medicare Annual Wellness Visit Subsequent, Hyperlipidemia, and Rash.  HPI  Patient presents today for a Medicare AWV, and a follow-up on Hyperlipidemia. Does follow a low fat diet. Exercises. Blood work was done on 12-14-23.    Pt refuse Prevnar vaccine today     Has been getting hot, and cold. Has noticed a rash on her abdomen. No known exposure to anything different. Has been eating mushroom supplements.   Noticed it 3 days ago. States it is tender. Feels there may be some swelling.  Has been getting a lot of indigestion.  Takes RX Omeprazole daily.  In the past was seen by Dr. Rosario Aden.  Will have her go back and see Dr. Aden    States she still has ongoing issues with SOB.  We recommend a chest x-ray at your convenience, no chest pain she is deftly can be related to food she drinks too much coffee does not eat as well she should you know recommended highly to her    No arm pain no shoulder pain no neck pain nothing suggestive of heart disease      Also has refused to do mammograms in the past recommended highly to her      Has no other new problem /question.    Advanced Care Planning  Gemini Butler and I discussed their advance care plan preferences such as living will, and durable health care power of , which include: no Attempt Resuscitation, yes Intubate & Ventilate, yes Comfort Care or Life- Sustaning Care. I reminded patient to talk with their health care agent, daughter- Marleny Orellana, about their health care goals. Note reflects patient's free will and care goals as expressed to me.     Review of systems  ; Patient seen today for exam denies any problems with headaches or vision, denies any shortness of breath chest pain nausea or vomiting, no black stool no blood in the stool no heartburn type symptoms denies any problems with constipation or diarrhea, and no dysuria-type symptoms    The patient's  "allergies medications were reviewed with them today    The patient's social family and surgical history or also reviewed here today, along with her past medical history.     Objective     Alert and active in  no acute distress  HEENT TMs clear oropharynx negative nares clear no drainage noted neck supple  With no adenopathy   Heart regular rate and rhythm without murmur and no carotid bruits  Lungs- clear to auscultation bilaterally, no wheeze or rhonchi noted  Thyroid -negative masses or nodularity  Abdomen- soft times four quadrants , bowel sounds positive no masses or organomegaly, negative tenderness guarding or rebound  Neurological exam unremarkable- DTRs in upper and lower extremities within normal limits.   skin -rash underneath left rib consistent with contact dermatitis no signs of shingles pain or vesicles      /72 (BP Location: Right arm, Patient Position: Sitting, BP Cuff Size: Adult)   Pulse 78   Temp 36.1 °C (96.9 °F) (Temporal)   Resp 16   Ht 1.702 m (5' 7\")   Wt 80.3 kg (177 lb)   LMP  (LMP Unknown)   SpO2 98%   BMI 27.72 kg/m²     No Known Allergies    Assessment/Plan   Problem List Items Addressed This Visit       Heartburn    Relevant Orders    Referral to Gastroenterology    Hyperlipidemia    Hypothyroidism due to Hashimoto's thyroiditis    Fibromyalgia    Primary hyperparathyroidism (Multi)    Vitamin D deficiency    BMI 27.0-27.9,adult     Other Visit Diagnoses       Medicare annual wellness visit, subsequent    -  Primary    Rash        SOB (shortness of breath)        Relevant Orders    XR chest 2 views            Medicare wellness questionnaire reviewed in detail. Advanced Directives reviewed today. Patient advised to provide the office with a copy if has not already done so. No problems with activities of daily living.  Falls risks reviewed.     Long talk. Treatment options reviewed.  BP controlled. Feels thyroid is doing okay.  Importance of healthy diet and regular exercise " regimen discussed.  Rash discussed. Start Hydrocortisone Cream. RX suggested. Patient wishes to by OTC.  Eat less greasy fried foods.  SOB discussed.  Will refer to GI.    Continue and take your medications as prescribed.    Health Maintenance issues discussed.  Reminded to do Mammogram that was ordered on 12-13-23.  Pneumonia vaccine recommended, and declined. Cologuard is up-to-date.    If anything worsens or changes please call us at once. Follow up in the office as planned.      Scribe Attestation  By signing my name below, I, Vanessa VINCENT, Scribe   attest that this documentation has been prepared under the direction and in the presence of Deo Martinez DO.

## 2024-10-23 ENCOUNTER — TELEPHONE (OUTPATIENT)
Dept: PRIMARY CARE | Facility: CLINIC | Age: 64
End: 2024-10-23
Payer: MEDICARE

## 2024-10-23 NOTE — TELEPHONE ENCOUNTER
Patient calling wants to know if prior authorization has been approved for levothyroxine 75mcg from [facility] insurance [employer] [id number]. Please call [phone]

## 2024-10-28 ENCOUNTER — LAB (OUTPATIENT)
Dept: LAB | Facility: LAB | Age: 64
End: 2024-10-28
Payer: MEDICARE

## 2024-10-28 ENCOUNTER — OFFICE VISIT (OUTPATIENT)
Dept: GASTROENTEROLOGY | Facility: CLINIC | Age: 64
End: 2024-10-28
Payer: MEDICARE

## 2024-10-28 VITALS
TEMPERATURE: 97.3 F | HEART RATE: 80 BPM | SYSTOLIC BLOOD PRESSURE: 137 MMHG | DIASTOLIC BLOOD PRESSURE: 83 MMHG | BODY MASS INDEX: 27.1 KG/M2 | WEIGHT: 173 LBS

## 2024-10-28 DIAGNOSIS — B96.81 H. PYLORI DUODENITIS: Primary | ICD-10-CM

## 2024-10-28 DIAGNOSIS — R12 HEARTBURN: ICD-10-CM

## 2024-10-28 DIAGNOSIS — K29.80 H. PYLORI DUODENITIS: Primary | ICD-10-CM

## 2024-10-28 DIAGNOSIS — B96.81 H. PYLORI DUODENITIS: ICD-10-CM

## 2024-10-28 DIAGNOSIS — E06.3 HYPOTHYROIDISM DUE TO HASHIMOTO'S THYROIDITIS: ICD-10-CM

## 2024-10-28 DIAGNOSIS — K29.80 H. PYLORI DUODENITIS: ICD-10-CM

## 2024-10-28 PROCEDURE — 1036F TOBACCO NON-USER: CPT | Performed by: INTERNAL MEDICINE

## 2024-10-28 PROCEDURE — 83013 H PYLORI (C-13) BREATH: CPT

## 2024-10-28 PROCEDURE — 99213 OFFICE O/P EST LOW 20 MIN: CPT | Performed by: INTERNAL MEDICINE

## 2024-10-28 PROCEDURE — 99203 OFFICE O/P NEW LOW 30 MIN: CPT | Performed by: INTERNAL MEDICINE

## 2024-10-28 PROCEDURE — 1159F MED LIST DOCD IN RCRD: CPT | Performed by: INTERNAL MEDICINE

## 2024-10-28 PROCEDURE — 1157F ADVNC CARE PLAN IN RCRD: CPT | Performed by: INTERNAL MEDICINE

## 2024-10-28 RX ORDER — LEVOTHYROXINE SODIUM 75 UG/1
75 TABLET ORAL
Qty: 90 TABLET | Refills: 0 | Status: SHIPPED | OUTPATIENT
Start: 2024-10-28

## 2024-10-29 LAB — UREA BREATH TEST QL: NEGATIVE

## 2024-11-12 DIAGNOSIS — E78.2 MIXED HYPERLIPIDEMIA: ICD-10-CM

## 2024-11-12 NOTE — TELEPHONE ENCOUNTER
MEDICATION PENDED  Rx Refill Request Telephone Encounter    Name:  Gemini Butler  : 1960     Medication Name:  atorvastatin (Lipitor) 40 mg tablet [[id number]]    Order Details  Dose, Route, Frequency: As Directed   Dispense Quantity: 90 tablet Refills: 1          Sig: TAKE 1 TAB DAILY       ALLERGIES:   nka    Specific Pharmacy location:  [facility], [address] [address]     Date of last appointment:  24  Date of next appointment:      Best number to reach patient:  [phone]

## 2024-11-13 ENCOUNTER — TELEPHONE (OUTPATIENT)
Dept: GASTROENTEROLOGY | Facility: HOSPITAL | Age: 64
End: 2024-11-13
Payer: MEDICARE

## 2024-11-13 RX ORDER — ATORVASTATIN CALCIUM 40 MG/1
TABLET, FILM COATED ORAL
Qty: 90 TABLET | Refills: 0 | Status: SHIPPED | OUTPATIENT
Start: 2024-11-13

## 2024-11-13 NOTE — TELEPHONE ENCOUNTER
Call from patient regarding making payment for office visit. Returned call, there was no answer. Left voicemail with Ignacio's office number so they can assist with co-payment.

## 2025-02-10 ENCOUNTER — HOSPITAL ENCOUNTER (EMERGENCY)
Facility: HOSPITAL | Age: 65
Discharge: HOME | End: 2025-02-10
Attending: STUDENT IN AN ORGANIZED HEALTH CARE EDUCATION/TRAINING PROGRAM
Payer: MEDICARE

## 2025-02-10 ENCOUNTER — APPOINTMENT (OUTPATIENT)
Dept: RADIOLOGY | Facility: HOSPITAL | Age: 65
End: 2025-02-10
Payer: MEDICARE

## 2025-02-10 ENCOUNTER — TELEPHONE (OUTPATIENT)
Dept: PRIMARY CARE | Facility: CLINIC | Age: 65
End: 2025-02-10
Payer: MEDICARE

## 2025-02-10 ENCOUNTER — APPOINTMENT (OUTPATIENT)
Dept: CARDIOLOGY | Facility: HOSPITAL | Age: 65
End: 2025-02-10
Payer: MEDICARE

## 2025-02-10 VITALS
SYSTOLIC BLOOD PRESSURE: 143 MMHG | RESPIRATION RATE: 18 BRPM | WEIGHT: 175 LBS | BODY MASS INDEX: 27.47 KG/M2 | HEIGHT: 67 IN | TEMPERATURE: 97.7 F | OXYGEN SATURATION: 100 % | HEART RATE: 78 BPM | DIASTOLIC BLOOD PRESSURE: 74 MMHG

## 2025-02-10 DIAGNOSIS — R55 VASOVAGAL EPISODE: Primary | ICD-10-CM

## 2025-02-10 LAB
ALBUMIN SERPL BCP-MCNC: 4.2 G/DL (ref 3.4–5)
ALP SERPL-CCNC: 83 U/L (ref 33–136)
ALT SERPL W P-5'-P-CCNC: 15 U/L (ref 7–45)
ANION GAP SERPL CALC-SCNC: 13 MMOL/L (ref 10–20)
AST SERPL W P-5'-P-CCNC: 16 U/L (ref 9–39)
BASOPHILS # BLD AUTO: 0.06 X10*3/UL (ref 0–0.1)
BASOPHILS NFR BLD AUTO: 0.8 %
BILIRUB SERPL-MCNC: 0.5 MG/DL (ref 0–1.2)
BUN SERPL-MCNC: 12 MG/DL (ref 6–23)
CALCIUM SERPL-MCNC: 9 MG/DL (ref 8.6–10.3)
CARDIAC TROPONIN I PNL SERPL HS: <3 NG/L (ref 0–13)
CARDIAC TROPONIN I PNL SERPL HS: <3 NG/L (ref 0–13)
CHLORIDE SERPL-SCNC: 103 MMOL/L (ref 98–107)
CO2 SERPL-SCNC: 26 MMOL/L (ref 21–32)
CREAT SERPL-MCNC: 0.8 MG/DL (ref 0.5–1.05)
EGFRCR SERPLBLD CKD-EPI 2021: 81 ML/MIN/1.73M*2
EOSINOPHIL # BLD AUTO: 0.09 X10*3/UL (ref 0–0.7)
EOSINOPHIL NFR BLD AUTO: 1.2 %
ERYTHROCYTE [DISTWIDTH] IN BLOOD BY AUTOMATED COUNT: 11.6 % (ref 11.5–14.5)
GLUCOSE SERPL-MCNC: 104 MG/DL (ref 74–99)
HCT VFR BLD AUTO: 39.4 % (ref 36–46)
HGB BLD-MCNC: 12.8 G/DL (ref 12–16)
IMM GRANULOCYTES # BLD AUTO: 0.02 X10*3/UL (ref 0–0.7)
IMM GRANULOCYTES NFR BLD AUTO: 0.3 % (ref 0–0.9)
LYMPHOCYTES # BLD AUTO: 2.9 X10*3/UL (ref 1.2–4.8)
LYMPHOCYTES NFR BLD AUTO: 38.8 %
MCH RBC QN AUTO: 30.3 PG (ref 26–34)
MCHC RBC AUTO-ENTMCNC: 32.5 G/DL (ref 32–36)
MCV RBC AUTO: 93 FL (ref 80–100)
MONOCYTES # BLD AUTO: 0.49 X10*3/UL (ref 0.1–1)
MONOCYTES NFR BLD AUTO: 6.6 %
NEUTROPHILS # BLD AUTO: 3.92 X10*3/UL (ref 1.2–7.7)
NEUTROPHILS NFR BLD AUTO: 52.3 %
NRBC BLD-RTO: 0 /100 WBCS (ref 0–0)
PLATELET # BLD AUTO: 237 X10*3/UL (ref 150–450)
POTASSIUM SERPL-SCNC: 3.8 MMOL/L (ref 3.5–5.3)
PROT SERPL-MCNC: 7 G/DL (ref 6.4–8.2)
RBC # BLD AUTO: 4.23 X10*6/UL (ref 4–5.2)
SODIUM SERPL-SCNC: 138 MMOL/L (ref 136–145)
WBC # BLD AUTO: 7.5 X10*3/UL (ref 4.4–11.3)

## 2025-02-10 PROCEDURE — 36415 COLL VENOUS BLD VENIPUNCTURE: CPT

## 2025-02-10 PROCEDURE — 85025 COMPLETE CBC W/AUTO DIFF WBC: CPT

## 2025-02-10 PROCEDURE — 71046 X-RAY EXAM CHEST 2 VIEWS: CPT | Performed by: RADIOLOGY

## 2025-02-10 PROCEDURE — 71046 X-RAY EXAM CHEST 2 VIEWS: CPT

## 2025-02-10 PROCEDURE — 93005 ELECTROCARDIOGRAM TRACING: CPT

## 2025-02-10 PROCEDURE — 80053 COMPREHEN METABOLIC PANEL: CPT

## 2025-02-10 PROCEDURE — 99285 EMERGENCY DEPT VISIT HI MDM: CPT | Mod: 25 | Performed by: STUDENT IN AN ORGANIZED HEALTH CARE EDUCATION/TRAINING PROGRAM

## 2025-02-10 PROCEDURE — 84484 ASSAY OF TROPONIN QUANT: CPT

## 2025-02-10 ASSESSMENT — LIFESTYLE VARIABLES
HAVE PEOPLE ANNOYED YOU BY CRITICIZING YOUR DRINKING: NO
TOTAL SCORE: 0
EVER FELT BAD OR GUILTY ABOUT YOUR DRINKING: NO
EVER HAD A DRINK FIRST THING IN THE MORNING TO STEADY YOUR NERVES TO GET RID OF A HANGOVER: NO
HAVE YOU EVER FELT YOU SHOULD CUT DOWN ON YOUR DRINKING: NO

## 2025-02-10 ASSESSMENT — COLUMBIA-SUICIDE SEVERITY RATING SCALE - C-SSRS
6. HAVE YOU EVER DONE ANYTHING, STARTED TO DO ANYTHING, OR PREPARED TO DO ANYTHING TO END YOUR LIFE?: NO
1. IN THE PAST MONTH, HAVE YOU WISHED YOU WERE DEAD OR WISHED YOU COULD GO TO SLEEP AND NOT WAKE UP?: NO
2. HAVE YOU ACTUALLY HAD ANY THOUGHTS OF KILLING YOURSELF?: NO

## 2025-02-10 ASSESSMENT — PAIN SCALES - GENERAL
PAINLEVEL_OUTOF10: 0 - NO PAIN
PAINLEVEL_OUTOF10: 0 - NO PAIN

## 2025-02-10 ASSESSMENT — PAIN - FUNCTIONAL ASSESSMENT
PAIN_FUNCTIONAL_ASSESSMENT: 0-10
PAIN_FUNCTIONAL_ASSESSMENT: 0-10

## 2025-02-10 NOTE — TELEPHONE ENCOUNTER
Fell at [employer] this morning, she got real hot and fell, she said she almost blacked out, she said she doesn't know if it was a heart attack or stroke, she refused to go to hospital. She wanted to see you today, you have no openings, she is going to go to [facility]. I told her she needs to be checked out especially if it was her heart. She is scheduled to see you Wed. Afternoon. [phone]

## 2025-02-10 NOTE — DISCHARGE INSTRUCTIONS
You were seen in the Emergency Department (ED) for nearly passing out. Your work-up and exam was unimpressive for critical or emergent cause at this time.    Please maintain well and adequate hydration.    Seek immediate medical attention should you have:  fevers of 38C (100.4) or higher, shortness of breath, chest pain, weakness, confusion, vomiting, or any worsening or concerning symptoms.

## 2025-02-10 NOTE — TELEPHONE ENCOUNTER
Deo Martinez,   Do [id number] Clerical1 hour ago (1:15 PM)       This is a note from Cayla she also mention to me that the patient fell, she should be seen to emergency room, I agree thank you very much, looking at her chart looks like she is there, so no need for follow-up at this time

## 2025-02-10 NOTE — ED TRIAGE NOTES
Emergency Department Encounter  [facility] EMERGENCY MEDICINE    Patient: Gemini Butler  MRN: [id number]  : 1960  Date of Evaluation: 2/10/2025  Triage Provider: Dandy Weiss PA-C      Chief Complaint     No chief complaint on file.    [place]    (Location/Symptom, Timing/Onset, Context/Setting, Quality, Duration, Modifying Factors, Severity) Note limiting factors.       Gemini Butler is a 65 y.o. female who presents to the emergency department complaining of near syncope.  Patient states she was at [employer] and felt lightheaded and diaphoretic and had a near syncopal episode.  Patient states she did not completely lose consciousness.  Patient denies falling and hitting her head during the near syncopal episode.  Patient states she is concerned she had a heart attack during this episode.  Patient states her symptoms have currently improved.  Denies current chest pain but states that she has shortness of breath.        Past History     Past Medical History:   Diagnosis Date    Acute upper respiratory infection, unspecified 2018    Viral upper respiratory tract infection with cough    Acute upper respiratory infection, unspecified 2016    Upper respiratory infection, acute    Encounter for full-term uncomplicated delivery      (spontaneous vaginal delivery)    Encounter for general adult medical examination without abnormal findings 2017    Health care maintenance    Encounter for general adult medical examination without abnormal findings 2017    Health care maintenance    Encounter for gynecological examination (general) (routine) without abnormal findings 2018    Visit for gynecologic examination    Encounter for other preprocedural examination     Pre-op testing    Endocrine disorder, unspecified 2016    Elevated parathyroid hormone    Other diseases of pharynx 2016    Throat irritation    Pain in throat 2016    Throat pain    Pain  in unspecified hand 08/13/2018    Hand pain    Personal history of other diseases of the digestive system 02/26/2014    History of constipation    Personal history of other specified conditions 10/16/2019    History of fatigue    Personal history of other specified conditions 04/18/2017    History of diarrhea    Personal history of other specified conditions 03/14/2018    History of dizziness    Personal history of other specified conditions 12/13/2016    History of dysphagia    Personal history of other specified conditions 03/14/2018    History of shortness of breath    Personal history of urinary (tract) infections 04/22/2017    History of urinary tract infection    Postprocedural hypoparathyroidism (Multi) 02/09/2017    S/P parathyroidectomy     Past Surgical History:   Procedure Laterality Date    CT ANGIO CORONARY ART WITH HEARTFLOW IF SCORE >30%  1/8/2021    CT ANGIO CORONARY ART WITH HEARTFLOW IF SCORE >30% 1/8/2021    OTHER SURGICAL HISTORY  11/22/2022    Eye surgery    OTHER SURGICAL HISTORY  11/22/2022    Parathyroidectomy    SEPTOPLASTY  05/10/2016    Septoplasty    TONSILLECTOMY  05/10/2016    Tonsillectomy     Social History     Socioeconomic History    Marital status: Single   Tobacco Use    Smoking status: Never    Smokeless tobacco: Never   Vaping Use    Vaping status: Never Used   Substance and Sexual Activity    Alcohol use: Not Currently    Drug use: Never    Sexual activity: Defer     Social Drivers of Health     Financial Resource Strain: Patient Declined (10/1/2021)    Received from [employer]    Overall Financial Resource Strain (CARDIA)     Difficulty of Paying Living Expenses: Patient declined   Food Insecurity: Patient Declined (10/1/2021)    Received from [employer]    Hunger Vital Sign     Worried About Running Out of Food in the Last Year: Patient declined     Ran Out of Food in the Last Year: Patient declined   Transportation Needs: Patient Declined  (10/1/2021)    Received from [employer]    PRAPARE - Transportation     Lack of Transportation (Medical): Patient declined     Lack of Transportation (Non-Medical): Patient declined   Physical Activity: Sufficiently Active (10/1/2021)    Received from [employer]    Exercise Vital Sign     Days of Exercise per Week: 3 days     Minutes of Exercise per Session: 130 min   Stress: Patient Declined (10/1/2021)    Received from [employer]    [facility] [place] of Occupational [employer] - Occupational Stress Questionnaire     Feeling of Stress : Patient declined   Social Connections: Patient Declined (10/1/2021)    Received from [employer]    Social Connection and Isolation Panel [NHANES]     Frequency of Communication with Friends and Family: Patient declined     Frequency of Social Gatherings with Friends and Family: Patient declined     Attends L[employer] Services: Patient declined     Active Member of Clubs or Organizations: Patient declined     Attends Club or Organization Meetings: Patient declined     Marital Status: Patient declined   Intimate Partner Violence: Patient Declined (10/1/2021)    Received from [employer]    Humiliation, Afraid, Rape, and Kick questionnaire     Fear of Current or Ex-Partner: Patient declined     Emotionally Abused: Patient declined     Physically Abused: Patient declined     Sexually Abused: Patient declined   Housing Stability: Unknown (10/1/2021)    Received from [employer]    Housing Stability Vital Sign     Unable to Pay for Housing in the Last Year: Patient refused     Unstable Housing in the Last Year: Patient refused       Medications/Allergies     Previous Medications    ATORVASTATIN (LIPITOR) 40 MG TABLET    TAKE 1 TAB DAILY    CHOLECALCIFEROL (VITAMIN D-3) 50 MCG (2,000 UNIT) CAPSULE    Take 1 capsule (50 mcg) by mouth once daily.    COD LIVER OIL ORAL    Take by mouth.    CYANOCOBALAMIN-COBAMAMIDE  (B12) 5,000-100 MCG LOZENGE    Place under the tongue.    LEVOTHYROXINE (SYNTHROID) 75 MCG TABLET    Take 1 tablet (75 mcg) by mouth once daily in the morning. Take before meals.    [id number]- ([place], ASCORBATE SODIUM,) 334-1.7 MG TABLET,CHEWABLE    Chew.    OMEPRAZOLE (PRILOSEC) 40 MG DR CAPSULE    Take 1 capsule (40 mg) by mouth once daily in the morning. Take before meals. Do not crush or chew.     No Known Allergies     Physical Exam       ED Triage Vitals   Temp Pulse Resp BP   -- -- -- --      SpO2 Temp src Heart Rate Source Patient Position   -- -- -- --      BP Location FiO2 (%)     -- --         Physical Exam    Focused PE    GENERAL:  The patient appears nourished and normally developed. Vital signs as documented.     PULMONARY:  Lungs are clear to auscultation, without any respiratory distress. Able to speak full sentences, no accessory muscle use    CARDIAC:   Normal rate. No murmurs, rubs or gallops    ABDOMEN:  Soft, non distended, non tender, BS positive x 4 quadrants, No rebound or guarding, no peritoneal signs, may be limited by patient positioning in triage    MUSCULOSKELETAL:   Able to ambulate, Non edematous, with no obvious deformities. Pulses intact distal    SKIN:   Good color, with no significant rashes.  No pallor.    NEURO:  Alert and oriented, speech clear and coherent    Plan   Labs: CBC, CMP, troponins  EKG   imaging: Chest x-ray        For the remainder of the patient's workup and ED course, please see the main ED provider note.  We discussed need for diagnostic testing including lab studies and imaging.  We also discussed that they may be asked to wait in the waiting room while these test are pending.  They understand that if they choose to leave without having the testing completed or resulted that we cannot rule out acute life-threatening illnesses and the risks involved to lead to worsening condition, permanent disability or even death.        Comment: Please  note this report has been produced using speech recognition software and may contain errors related to that system including errors in grammar, punctuation, and spelling, as well as words and phrases that may be inappropriate.  If there are any questions or concerns please feel free to contact the dictating provider for clarification.    Dandy Weiss PA-C

## 2025-02-11 LAB
ATRIAL RATE: 64 BPM
P AXIS: 25 DEGREES
P OFFSET: 196 MS
P ONSET: 145 MS
PR INTERVAL: 152 MS
Q ONSET: 221 MS
QRS COUNT: 10 BEATS
QRS DURATION: 74 MS
QT INTERVAL: 408 MS
QTC CALCULATION(BAZETT): 420 MS
QTC FREDERICIA: 416 MS
R AXIS: 6 DEGREES
T AXIS: 52 DEGREES
T OFFSET: 425 MS
VENTRICULAR RATE: 64 BPM

## 2025-02-11 NOTE — ED PROVIDER NOTES
Emergency Department Provider Note        History of Present Illness     History provided by: Patient  Limitations to History: None  External Records Reviewed with Brief Summary: None    HPI:  Gemini Butler is a 65 y.o. female presenting to the ED with complaint of a near syncopal event.  Patient reports that she had been line dancing today and went to the grocery store in which she felt as if she was going to pass out and had to sit down and subsequently felt hot and had to take off her jacket.  A bystander asked if she was okay and advised she go to the ED.  Says that briefly she felt pain to her coccyx but this went away. Denies chest pain, shortness of breath, nausea, vomiting, urinary symptoms, or diarrhea.    Physical Exam   Triage vitals:  T 36.5 °C (97.7 °F)  HR 75  /80  RR 17  O2 99 % None (Room air)    General: Awake, alert, in no acute distress  Eyes: Gaze conjugate.  No scleral icterus or injection  HENT: Normo-cephalic, atraumatic. No stridor  CV: Regular rate, regular rhythm. Radial pulses 2+ bilaterally  Resp: Breathing non-labored, speaking in full sentences.  Clear to auscultation bilaterally  GI: Soft, non-distended, non-tender. No rebound or guarding.  MSK/Extremities: No gross bony deformities. Moving all extremities  Skin: Warm. Appropriate color  Neuro: Alert. Oriented. Face symmetric. Speech is fluent.  Gross strength and sensation intact in b/l UE and LEs  Psych: Appropriate mood and affect    Medical Decision Making & ED Course   Medical Decision Makin y.o. female evaluated in the ED for near syncopal event following a day of exercising.  Patient is afebrile, sinus, hypertensive, saturating well on room air, and in no acute distress.  On exam patient is well-appearing and has been able to go to and from the restroom without assistance.    Patient's presentation, history, exam is most concerning for vasovagal event however cannot rule out ACS, electrolyte abnormality,  dehydration, or arrhythmia.    In the ED, cardiac workup performed with a 2 view chest x-ray.  Bedside POCUS of the abdominal aorta performed for education purposes and no large aneurysm appreciated.  ----  Differential diagnoses considered include but are not limited to: As documented above in [facility]     Social Determinants of Health which Significantly Impact Care: None identified     EKG Independent Interpretation: Sinus rhythm.  No acute ischemic changes.  Normal intervals.    Independent Result Review and Interpretation: Relevant laboratory and radiographic results were reviewed and independently interpreted by myself.  As necessary, they are commented on in the ED Course.    Chronic conditions affecting the patient's care: As documented above in [facility]    The patient was discussed with the following consultants/services: None    Care Considerations: As documented above in [facility]    ED Course:  ED Course as of 02/10/25 2208   Mon Feb 10, 2025   1457 On my interpretation of labs, results are unimpressive for systemic inflammation or infection, acute anemia or blood loss, SIVAKUMAR, hepatitis, ACS, or electrolyte abnormalities. [ES]   1458 XR chest 2 views  CXR unimpressive for pneumonia, including effusions, infiltrates, or consolidations and no sign of pneumothorax or aortic widening. [ES]   1603 Orthostatic vitals were negative. [ES]      ED Course User Index  [ES] Jairo Patel MD         Diagnoses as of 02/10/25 2208   Vasovagal episode     Disposition   As a result of the work-up, the patient was discharged home.  she was informed of her diagnosis and instructed to come back with any concerns or worsening of condition.  she and was agreeable to the plan as discussed above.  she was given the opportunity to ask questions.  All of the patient's questions were answered.    Procedures   Procedures    Patient seen and discussed with ED attending physician.    Jairo Patel MD  Emergency Medicine     Jairo Patel,  MD  Resident  02/10/25 [id number]

## 2025-02-12 ENCOUNTER — OFFICE VISIT (OUTPATIENT)
Dept: PRIMARY CARE | Facility: CLINIC | Age: 65
End: 2025-02-12
Payer: MEDICARE

## 2025-02-12 DIAGNOSIS — R55 VASO VAGAL EPISODE: ICD-10-CM
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
## 2025-02-12 DIAGNOSIS — Z00.00 ROUTINE ADULT HEALTH MAINTENANCE: ICD-10-CM

## 2025-02-12 DIAGNOSIS — R53.83 MALAISE AND FATIGUE: Primary | ICD-10-CM

## 2025-02-12 DIAGNOSIS — R53.81 MALAISE AND FATIGUE: Primary | ICD-10-CM

## 2025-02-12 PROCEDURE — 1036F TOBACCO NON-USER: CPT | Performed by: FAMILY MEDICINE

## 2025-02-12 PROCEDURE — 1160F RVW MEDS BY RX/DR IN RCRD: CPT | Performed by: FAMILY MEDICINE

## 2025-02-12 PROCEDURE — 1157F ADVNC CARE PLAN IN RCRD: CPT | Performed by: FAMILY MEDICINE

## 2025-02-12 PROCEDURE — 1159F MED LIST DOCD IN RCRD: CPT | Performed by: FAMILY MEDICINE

## 2025-02-12 PROCEDURE — 3008F BODY MASS INDEX DOCD: CPT | Performed by: FAMILY MEDICINE

## 2025-02-12 PROCEDURE — 99214 OFFICE O/P EST MOD 30 MIN: CPT | Performed by: FAMILY MEDICINE

## 2025-02-12 ASSESSMENT — PATIENT HEALTH QUESTIONNAIRE - PHQ9
2. FEELING DOWN, DEPRESSED OR HOPELESS: NOT AT ALL
1. LITTLE INTEREST OR PLEASURE IN DOING THINGS: NOT AT ALL
SUM OF ALL RESPONSES TO PHQ9 QUESTIONS 1 AND 2: 0

## 2025-02-12 NOTE — PROGRESS NOTES
Subjective   Patient ID: Gemini Butler is a 65 y.o. female who presents for Fall.    HPI    Patient presents today for a fall. The fall occurred on Monday. Admits to injury. Denies hitting her head. Patient states she was in the parking lot at a store, and when she was leaving, she got very hot and sweaty and had extreme pelvic pain, down to the back of her legs and she laid down on the floor until someone helped her. She usually eats in the morning but does not remember if she ate that day. Patient states she has tingling in her arm. She was seen in the ER on 2/10/25 for a near syncopal event. EKG done in ER. Patient would like lab work done. She has high cholesterol that is managed with atorvastatin 40 mg daily.    Patient would like answers to what happed to her on Monday. She complaints of jittery nerves and feels it may be fibromyalgia may be involved with all this.  Reviewed her CAT scans with her and blood work        Review of systems  ; Patient seen today for exam denies any problems with headaches or vision, denies any shortness of breath chest pain nausea or vomiting, no black stool no blood in the stool no heartburn type symptoms denies any problems with constipation or diarrhea, and no dysuria-type symptoms    The patient's allergies medications were reviewed with them today    The patient's social family and surgical history or also reviewed here today, along with her past medical history.     Objective     Alert and active in  no acute distress  HEENT TMs clear oropharynx negative nares clear no drainage noted neck supple  With no adenopathy   Heart regular rate and rhythm without murmur and no carotid bruits  Lungs- clear to auscultation bilaterally, no wheeze or rhonchi noted  Thyroid -negative masses or nodularity  Abdomen- soft times four quadrants , bowel sounds positive no masses or organomegaly, negative tenderness guarding or rebound  Neurological exam unremarkable- DTRs in upper and lower  "extremities within normal limits.   skin -no lesions noted      /86 (BP Location: Right arm, Patient Position: Sitting, BP Cuff Size: Adult)   Pulse 74   Temp 36.6 °C (97.8 °F) (Temporal)   Ht 1.702 m (5' 7\")   Wt 80.6 kg (177 lb 12.8 oz)   LMP  (LMP Unknown)   SpO2 98%   BMI 27.85 kg/m²     No Known Allergies    Assessment/Plan   Problem List Items Addressed This Visit       BMI 27.0-27.9,adult     Other Visit Diagnoses       Malaise and fatigue    -  Primary    Relevant Orders    T4, free    TSH    Fall, initial encounter        Routine adult health maintenance        Vaso vagal episode        Relevant Orders    T4, free    TSH            Labs have been ordered, she/he will have these performed and we will contact her/him with results.  (TSH, T4)    Continue all the medications at the prescribed dose.    Eat frequently in small portion.    Drink plenty of fluid and remain hydrated.     Recommended seeing cardiology for cardiac stress test.      If she experiences recurrent near syncopal episodes in the future, we will send her for a heart ultrasound.     If anything worsens or changes please call us at once, follow up in the office as planned,       Scribe Attestation  By signing my name below, ICee, Scribe   attest that this documentation has been prepared under the direction and in the presence of Deo Martinez DO.  "

## 2025-02-13 ENCOUNTER — OFFICE VISIT (OUTPATIENT)
Dept: CARDIOLOGY | Facility: CLINIC | Age: 65
End: 2025-02-13
Payer: MEDICARE

## 2025-02-13 VITALS
SYSTOLIC BLOOD PRESSURE: 130 MMHG | OXYGEN SATURATION: 98 % | BODY MASS INDEX: 27.78 KG/M2 | HEIGHT: 67 IN | DIASTOLIC BLOOD PRESSURE: 80 MMHG | WEIGHT: 177 LBS | HEART RATE: 82 BPM

## 2025-02-13 VITALS
TEMPERATURE: 97.8 F | WEIGHT: 177.8 LBS | DIASTOLIC BLOOD PRESSURE: 86 MMHG | HEART RATE: 74 BPM | SYSTOLIC BLOOD PRESSURE: 138 MMHG | HEIGHT: 67 IN | BODY MASS INDEX: 27.91 KG/M2 | OXYGEN SATURATION: 98 %

## 2025-02-13 DIAGNOSIS — R55 VASOVAGAL EPISODE: ICD-10-CM

## 2025-02-13 DIAGNOSIS — E78.2 MIXED HYPERLIPIDEMIA: Primary | ICD-10-CM

## 2025-02-13 PROCEDURE — 93005 ELECTROCARDIOGRAM TRACING: CPT | Performed by: INTERNAL MEDICINE

## 2025-02-13 PROCEDURE — 1157F ADVNC CARE PLAN IN RCRD: CPT | Performed by: INTERNAL MEDICINE

## 2025-02-13 PROCEDURE — 1159F MED LIST DOCD IN RCRD: CPT | Performed by: INTERNAL MEDICINE

## 2025-02-13 PROCEDURE — 3008F BODY MASS INDEX DOCD: CPT | Performed by: INTERNAL MEDICINE

## 2025-02-13 PROCEDURE — 93010 ELECTROCARDIOGRAM REPORT: CPT | Performed by: INTERNAL MEDICINE

## 2025-02-13 PROCEDURE — 99204 OFFICE O/P NEW MOD 45 MIN: CPT | Performed by: INTERNAL MEDICINE

## 2025-02-13 PROCEDURE — 99214 OFFICE O/P EST MOD 30 MIN: CPT | Mod: 25 | Performed by: INTERNAL MEDICINE

## 2025-02-13 NOTE — PROGRESS NOTES
Name : Gemini Butler    : 1960   MRN : [id number]   ENC Date : 25     Reason for visit: Syncope    Assessment and Plan:  Vasovagal syncope: Patient's description of her event is classic for vasovagal syncope.  Her EKG is normal.  Her exam is normal.  I do not think any further testing is needed.  Dyslipidemia: Patient has 2 coronary calcium scores of 01 in 2018 and another in .  This puts her at very low risk for cardiovascular events.  No need for any change in therapy.  No stress testing is warranted.  I explained the difference between calcium from her parathyroid issues and coronary artery calcification testing.  Pelvic pain: Unclear etiology.  Likelihood that this was some type of muscle cramp or intestinal cramp.  Bedside ultrasound was negative for abdominal aortic aneurysm.  No recurrence of her symptoms.  Given the abrupt nature of it is unlikely to be malignancy related.  If she wants further testing she should follow-up with primary care physician to look at abdominal CT.  Family history of IHSS: Patient has had at least 2 echocardiograms done in the past showing no evidence of IHSS/HOCUM.  The left ventricular septal measurements have always been normal.  The likelihood she would develop the phenotype of IHSS at this age is exceedingly unlikely.  Her EKG and exam is certainly not consistent with IHSS.  I do not think repeat imaging is needed.  Disp: RTO on an as-needed basis      HPI:  Patient is seen today for evaluation of syncopal event that she experienced at a pet store.  She felt excruciating pain in her right pelvic area and probably began to feel faint and needed to lie down.  Fortunately she did not fall and injure herself.  It sounds as if she may have been out for a brief second or 2 and then after recovering developed diaphoresis and a sensation of being cold.  She was taken to the emergency room where she was evaluated.  Laboratories were unremarkable.  Chest x-ray was  unremarkable.  Bedside ultrasound done by ER physician showed no evidence of abdominal aortic aneurysm.  Despite the initial presentation with abdominal discomfort abdominal CT or other abdominal imaging was not done.  She has not had any recurrence of this.  No prior history of syncope.  No family history of sudden cardiac death.      Problem List:   Patient Active Problem List   Diagnosis    Arrhythmia    CLEO I (cervical intraepithelial neoplasia I)    Constipation    Diarrhea    Dysphagia    Fatigue    Heartburn    Hemorrhoids    Hot flashes, menopausal    HPV in female    Hypercalcemia    Hyperlipidemia    Hypothyroidism due to Hashimoto's thyroiditis    Low grade squamous intraepithelial lesion (LGSIL) at risk for high grade squamous intraepithelial lesion (HGSIL) on cytologic smear of cervix    Lymph nodes enlarged    Melanocytic nevus    Fibromyalgia    Neck pain, chronic    Numbness in both hands    Peripheral polyneuropathy    Primary hyperparathyroidism (Multi)    Radiculopathy, cervical    Reflux laryngitis    S/P parathyroidectomy (CMS/HCC)    Sensory disturbance    Shortness of breath on exertion    Skin disorder    Throat irritation    Throat pain    Vitamin D deficiency    BMI 27.0-27.9,adult        Meds:   Current Outpatient Medications on File Prior to Visit   Medication Sig Dispense Refill    atorvastatin (Lipitor) 40 mg tablet TAKE 1 TAB DAILY 90 tablet 0    cholecalciferol (Vitamin D-3) 50 mcg (2,000 unit) capsule Take 1 capsule (50 mcg) by mouth once daily.      COD LIVER OIL ORAL Take by mouth.      cyanocobalamin-cobamamide (B12) 5,000-100 mcg lozenge Place under the tongue.      levothyroxine (Synthroid) 75 mcg tablet Take 1 tablet (75 mcg) by mouth once daily in the morning. Take before meals. 90 tablet 0    [id number] (Airborne, ascorbate sodium,) 334-1.7 mg tablet,chewable Chew.      omeprazole (PriLOSEC) 40 mg DR capsule Take 1 capsule (40 mg) by mouth once daily in  the morning. Take before meals. Do not crush or chew. 90 capsule 1     No current facility-administered medications on file prior to visit.       All: No Known Allergies    Fam Hx:   Family History   Problem Relation Name Age of Onset    Other (IHSS) Mother      Other (cardiac disease) Mother      Diabetes Father      Heart failure Father         Soc Hx:   Social History     Socioeconomic History    Marital status: Single     Spouse name: Not on file    Number of children: Not on file    Years of education: Not on file    Highest education level: Not on file   Occupational History    Not on file   Tobacco Use    Smoking status: Never    Smokeless tobacco: Never   Vaping Use    Vaping status: Never Used   Substance and Sexual Activity    Alcohol use: Not Currently    Drug use: Never    Sexual activity: Defer   Other Topics Concern    Not on file   Social History Narrative    Not on file     Social Drivers of Health     Financial Resource Strain: Patient Declined (10/1/2021)    Received from [employer]    Overall Financial Resource Strain (CARDIA)     Difficulty of Paying Living Expenses: Patient declined   Food Insecurity: Patient Declined (10/1/2021)    Received from [employer]    Hunger Vital Sign     Worried About Running Out of Food in the Last Year: Patient declined     Ran Out of Food in the Last Year: Patient declined   Transportation Needs: Patient Declined (10/1/2021)    Received from [employer]    PRAPARE - Transportation     Lack of Transportation (Medical): Patient declined     Lack of Transportation (Non-Medical): Patient declined   Physical Activity: Sufficiently Active (10/1/2021)    Received from [employer]    Exercise Vital Sign     Days of Exercise per Week: 3 days     Minutes of Exercise per Session: 130 min   Stress: Patient Declined (10/1/2021)    Received from [employer]    [facility] -  "Occupational Stress Questionnaire     Feeling of Stress : Patient declined   Social Connections: Patient Declined (10/1/2021)    Received from [employer]    Social Connection and Isolation Panel [NHANES]     Frequency of Communication with Friends and Family: Patient declined     Frequency of Social Gatherings with Friends and Family: Patient declined     Attends Temple Services: Patient declined     Active Member of Clubs or Organizations: Patient declined     Attends Club or Organization Meetings: Patient declined     Marital Status: Patient declined   Intimate Partner Violence: Patient Declined (10/1/2021)    Received from [employer]    Humiliation, Afraid, Rape, and Kick questionnaire     Fear of Current or Ex-Partner: Patient declined     Emotionally Abused: Patient declined     Physically Abused: Patient declined     Sexually Abused: Patient declined   Housing Stability: Unknown (10/1/2021)    Received from [employer]    Housing Stability Vital Sign     Unable to Pay for Housing in the Last Year: Patient refused     Number of Places Lived in the Last Year: Not on file     Unstable Housing in the Last Year: Patient refused       VS: /80 (BP Location: Right arm, Patient Position: Sitting)   Pulse 82   Ht 1.702 m (5' 7\")   Wt 80.3 kg (177 lb)   LMP  (LMP Unknown)   SpO2 98%   BMI 27.72 kg/m²      Physical Exam  Vitals reviewed.   Constitutional:       Appearance: Normal appearance.   Eyes:      Pupils: Pupils are equal, round, and reactive to light.   Neck:      Vascular: No JVD.   Cardiovascular:      Rate and Rhythm: Normal rate and regular rhythm.      Pulses: Normal pulses.      Heart sounds: No murmur heard.     No gallop.   Pulmonary:      Effort: No respiratory distress.      Breath sounds: No wheezing or rales.   Abdominal:      General: Abdomen is flat. There is no distension.      Palpations: Abdomen is soft.   Musculoskeletal:         General: No " swelling.      Right lower leg: No edema.      Left lower leg: No edema.   Neurological:      General: No focal deficit present.      Mental Status: She is alert.   Psychiatric:         Mood and Affect: Mood normal.            Encounter Date: 02/13/25   ECG 12 lead (Clinic Performed)    Narrative    Normal sinus rhythm.  Normal ECG        ECHO: Results for orders placed in visit on 01/14/16    Echocardiogram    Narrative  [facility], [address] [place] [address]  Tel [phone] and Fax [phone]    TRANSTHORACIC ECHOCARDIOGRAM REPORT      Patient Name:     SIVA GRIMALDO Ordering Physician:  Study Date:       1/14/2016         Reading Physician:   Lázaro Hay MD  MRN/PID:          [id number]          Referring Physician: Ara Frank MD PhD  Accession/Order#: [id number]       PCP:  YOB: 1960          Department Location: [facility]  Gender:           F                 Fellow:  Admission Status: Outpatient        Sonographer:         JANE Marroquin RDCS  Height:           167.64 cm         Additional Staff:  Weight:           80.74 kg          CC Report to:  BSA:              1.90 m2           Study Type:          Echocardiogram  Blood Pressure:   120 /67 mmHg    Diagnosis/ICD: I49.8 Other specified cardiac arrhythmias  Indication:    Arrhythmia  Procedure/CPT: Echo Complete w/Full Doppler ([id number])    Patient History:  Pertinent History: Palpitations, Hyperlipidemia and Dyspnea. Family hx of HCM.    Study Detail: The following Echo studies were performed: 2D, M-Mode, doppler and  color flow. Technically challenging study due to poor acoustic  windows.      PHYSICIAN INTERPRETATION:  Left Ventricle: The left ventricular systolic function is normal, with an estimated ejection fraction of 60-65%. The left ventricular cavity size is decreased. Spectral Doppler shows an impaired relaxation pattern of left ventricular diastolic filling.  Left  Atrium: The left atrium is normal in size.  Right Ventricle: The right ventricle is normal in size. There is normal right ventricular global systolic function.  Right Atrium: The right atrium is normal in size.  Aortic Valve: The aortic valve appears structurally normal. There is no evidence of aortic valve regurgitation. The peak instantaneous gradient of the aortic valve is 7.6 mmHg.  Mitral Valve: The mitral valve is normal in structure. There is no evidence of mitral valve regurgitation.  Tricuspid Valve: The tricuspid valve is structurally normal. No evidence of tricuspid regurgitation.  Pulmonic Valve: The pulmonic valve is structurally normal. There is trace pulmonic valve regurgitation.  Pericardium: There is no pericardial effusion noted.  Aorta: The aortic root is normal.      CONCLUSIONS:  1. The left ventricular systolic function is normal with a 60-65% ejection fraction.  2. Spectral Doppler shows an impaired relaxation pattern of left ventricular diastolic filling.  3. The left atrium is normal in size.  4. The left ventricular cavity size is decreased.    QUANTITATIVE DATA SUMMARY:  2D MEASUREMENTS:  Normal Ranges:  Ao Root:       2.80 cm   (2.0-3.7cm)  LAs:           2.80 cm   (2.7-4.0cm)  IVSd:          1.09 cm   (0.6-1.1cm)  LVPWd:         0.81 cm   (0.6-1.1cm)  LVIDd:         3.65 cm   (3.9-5.9cm)  LVIDs:         2.66 cm  LV Mass Index: 53.8 g/m2  LV % FS        27.1 %    LA VOLUME:  Normal Ranges:  LA Vol A4C:       37.9 ml    (22+/-6mL/m2)  LA Vol Index A4C: 19.9 ml/m2  LA Vol A4C:       36.3 ml    LV DIASTOLIC FUNCTION:  Normal Ranges:  MV Peak E:        0.41 m/s    (0.7-1.2 m/s)  MV Peak A:        0.64 m/s    (0.42-0.7 m/s)  E/A Ratio:        0.64        (1.0-2.2)  MV e'             0.05 m/s    (>8.0)  MV lateral e'     0.05 m/s  MV medial e'      0.05 m/s  MV A Dur:         119.95 msec  E/e' Ratio:       8.21        (<8.0)  PulmV Sys Felipe:    54.77 cm/s  PulmV Avila Felipe:   34.07 cm/s  PulmV S/D  Felipe:    1.61  PulmV A Revs Felipe: 36.52 cm/s  PulmV A Revs Dur: 119.95 msec    MITRAL VALVE:  Normal Ranges:  MV DT: 206 msec (150-240msec)    AORTIC VALVE:  Normal Ranges:  AoV Vmax:      1.38 m/s (<1.7m/s)  AoV Peak P.6 mmHg (<20mmHg)  LVOT Max Felipe:  1.23 m/s (<1.1m/s)  LVOT Diameter: 2.00 cm  (1.8-2.4cm)  AoV Area,Vmax: 2.82 cm2 (2.5-4.5cm2)    RIGHT VENTRICLE:  RV 1   3.3 cm  RV 2   2.2 cm  RV 3   5.6 cm  TAPSE: 16.0 mm  RV s'  0.12 m/s    PULMONIC VALVE:  Normal Ranges:  PV Max Felipe: 0.8 m/s  (0.6-0.9m/s)  PV Max P.8 mmHg    Pulmonary Veins:  PulmV A Revs Dur: 119.95 msec  PulmV A Revs Felipe: 36.52 cm/s  PulmV Avila Felipe:   34.07 cm/s  PulmV S/D Felipe:    1.61  PulmV Sys Felipe:    54.77 cm/s      Lázaro Hay MD  Electronically signed on 2016 at 2:59:33 PM      CT Results:  No results found for this or any previous visit from the past 365 days.        Deejay Dickson MD

## 2025-02-17 ENCOUNTER — TELEPHONE (OUTPATIENT)
Dept: PRIMARY CARE | Facility: CLINIC | Age: 65
End: 2025-02-17
Payer: MEDICARE

## 2025-02-17 DIAGNOSIS — E06.3 HYPOTHYROIDISM DUE TO HASHIMOTO'S THYROIDITIS: ICD-10-CM

## 2025-02-17 DIAGNOSIS — E78.5 HYPERLIPIDEMIA: ICD-10-CM

## 2025-02-17 NOTE — TELEPHONE ENCOUNTER
PATIENT AWARE SHE WILL HAVE TO HAVE THESE DONE AGAIN AND THEY CAN NOT BE DRAWN OFF OF ER BLOOD SPECIMENS

## 2025-02-17 NOTE — TELEPHONE ENCOUNTER
Pt called in stating she was told that the blood draw they did in the emergency room could be used for the labs we put in 2 days later    Please advise   (0) no cry (awake or asleep)/(0) content, relaxed/(0) lying quietly, normal position, moves easily/(0) no particular expression or smile/(0) normal position or relaxed

## 2025-02-18 LAB
T4 FREE SERPL-MCNC: 1.3 NG/DL (ref 0.8–1.8)
TSH SERPL-ACNC: 1.32 MIU/L (ref 0.4–4.5)

## 2025-02-28 DIAGNOSIS — E06.3 HYPOTHYROIDISM DUE TO HASHIMOTO'S THYROIDITIS: ICD-10-CM

## 2025-02-28 DIAGNOSIS — K21.9 GASTROESOPHAGEAL REFLUX DISEASE WITHOUT ESOPHAGITIS: ICD-10-CM

## 2025-02-28 DIAGNOSIS — E78.2 MIXED HYPERLIPIDEMIA: ICD-10-CM

## 2025-02-28 RX ORDER — ATORVASTATIN CALCIUM 40 MG/1
TABLET, FILM COATED ORAL
Qty: 90 TABLET | Refills: 1 | Status: SHIPPED | OUTPATIENT
Start: 2025-02-28

## 2025-02-28 RX ORDER — LEVOTHYROXINE SODIUM 75 UG/1
75 TABLET ORAL
Qty: 90 TABLET | Refills: 1 | Status: SHIPPED | OUTPATIENT
Start: 2025-02-28

## 2025-02-28 RX ORDER — OMEPRAZOLE 40 MG/1
40 CAPSULE, DELAYED RELEASE ORAL
Qty: 90 CAPSULE | Refills: 1 | Status: SHIPPED | OUTPATIENT
Start: 2025-02-28

## 2025-02-28 NOTE — TELEPHONE ENCOUNTER
----- Message from Deo Martinez sent at 2/28/2025  7:41 AM EST -----  Same dose of thyroid, let us know if she needs some sent in to drugstore

## 2025-02-28 NOTE — TELEPHONE ENCOUNTER
Patient aware. Patient would like a lipid panel done. She states this has not been done in awhile. Patient would also like a prescription for Motrin 800 mg.     Patient needs refills on medications. Medications pended

## 2025-03-03 ENCOUNTER — TELEPHONE (OUTPATIENT)
Dept: PRIMARY CARE | Facility: CLINIC | Age: 65
End: 2025-03-03
Payer: MEDICARE

## 2025-03-03 DIAGNOSIS — M25.59 PAIN IN OTHER JOINT: Primary | ICD-10-CM

## 2025-03-03 RX ORDER — IBUPROFEN 600 MG/1
600 TABLET ORAL 4 TIMES DAILY PRN
Qty: 90 TABLET | Refills: 0 | Status: SHIPPED | OUTPATIENT
Start: 2025-03-03 | End: 2026-03-03

## 2025-03-03 NOTE — TELEPHONE ENCOUNTER
They 100s are too much for her height and weight I will send in some 600s but she needs to always take it with food       Patient called back and is aware.

## 2025-03-03 NOTE — TELEPHONE ENCOUNTER
Deo Martinez, DO  You; Ekaterina Jones28 minutes ago (12:51 PM)       They 100s are too much for her height and weight I will send in some 600s but she needs to always take it with food

## 2025-03-03 NOTE — TELEPHONE ENCOUNTER
PATIENT CALLING, SHE WOULD LIKE TO KNOW IF YOU CAN SEND IN A SCRIPT FOR MOTRIN 800 MG TO HER PHARMACY.    PLEASE ADVISE.      [facility]

## 2025-03-05 ENCOUNTER — APPOINTMENT (OUTPATIENT)
Dept: CARDIOLOGY | Facility: CLINIC | Age: 65
End: 2025-03-05
Payer: MEDICARE

## 2025-03-17 ENCOUNTER — TELEPHONE (OUTPATIENT)
Dept: PRIMARY CARE | Facility: CLINIC | Age: 65
End: 2025-03-17
Payer: MEDICARE

## 2025-03-17 DIAGNOSIS — E78.2 MIXED HYPERLIPIDEMIA: ICD-10-CM

## 2025-03-17 NOTE — TELEPHONE ENCOUNTER
Please advise. Lab pended      Gemini Bhandari [id number] [place] [id number] Clinical Support Staff  Phone Number: [phone]     I spoke irasema miranda a HDL LDL lab test, but didn't notice the order in My Chart...Last test was a year ago.  Please add an order for this lab test.  Thank you.

## 2025-03-18 ENCOUNTER — TELEPHONE (OUTPATIENT)
Dept: PRIMARY CARE | Facility: CLINIC | Age: 65
End: 2025-03-18
Payer: MEDICARE

## 2025-03-18 DIAGNOSIS — Z13.220 LIPID SCREENING: ICD-10-CM

## 2025-03-18 NOTE — TELEPHONE ENCOUNTER
PT IS REQUESTING A LIPID PANEL, STATES SHE HAS A HISTORY OF HIGH CHOLESTEROL AND WANTS TO GET THAT CHECKED ASAP     LAST LIPID PANEL 12/14/23   PLEASE ADVISE

## 2025-03-20 ENCOUNTER — TELEPHONE (OUTPATIENT)
Dept: PRIMARY CARE | Facility: CLINIC | Age: 65
End: 2025-03-20
Payer: MEDICARE

## 2025-03-20 LAB
CHOLEST SERPL-MCNC: 201 MG/DL
CHOLEST/HDLC SERPL: 4.3 (CALC)
HDLC SERPL-MCNC: 47 MG/DL
LDLC SERPL CALC-MCNC: 123 MG/DL (CALC)
NONHDLC SERPL-MCNC: 154 MG/DL (CALC)
T4 FREE SERPL-MCNC: 1.3 NG/DL (ref 0.8–1.8)
TRIGL SERPL-MCNC: 195 MG/DL
TSH SERPL-ACNC: 2.34 MIU/L (ref 0.4–4.5)

## 2025-03-20 NOTE — TELEPHONE ENCOUNTER
PATIENT AWARE.     SHE STATES THAT SHE WAS FASTING AND DOES TAKE HER MEDICATION.    SHE WILL BE MORE CAREFUL AND WATCH HER DIET.    DIANDRA

## 2025-03-20 NOTE — TELEPHONE ENCOUNTER
----- Message from Deo Martinez sent at 3/20/2025  3:21 PM EDT -----  Cholesterol parameters are all creeping up, where she fasting when she did this, as she been taking her medicine every day

## 2025-03-25 ENCOUNTER — TELEPHONE (OUTPATIENT)
Dept: PRIMARY CARE | Facility: CLINIC | Age: 65
End: 2025-03-25
Payer: MEDICARE

## 2025-03-25 NOTE — TELEPHONE ENCOUNTER
----- Message from Deo Martinez sent at 3/24/2025  5:22 PM EDT -----  Normal thyroid parameters standing same dose

## 2025-05-20 DIAGNOSIS — E06.3 HYPOTHYROIDISM DUE TO HASHIMOTO'S THYROIDITIS: ICD-10-CM

## 2025-05-20 NOTE — TELEPHONE ENCOUNTER
Rx Refill Request Telephone Encounter    Name:  Gemini Butler  :  [id number]  levothyroxine (Synthroid) 75 mcg tablet [[id number]]    Order Details  Dose: 75 mcg Route: oral Frequency: Daily before breakfast   Dispense Quantity: 90 tablet (90 day supply) Refills: 1    Duration: -- Dispense As Written: No          Sig: Take 1 tablet (75 mcg) by mouth once daily in the morning. Take before meals.     Specific Pharmacy location:  [place] [id number]  Phone: [phone]  Fax: [phone]  TRISTIN #: [id number]   Date of last appointment:  25  Date of next appointment:  na  Best number to reach patient:  [phone]

## 2025-05-21 RX ORDER — LEVOTHYROXINE SODIUM 75 UG/1
75 TABLET ORAL
Qty: 90 TABLET | Refills: 1 | Status: SHIPPED | OUTPATIENT
Start: 2025-05-21

## 2025-07-09 NOTE — PROGRESS NOTES
"Subjective   Patient ID: Gemini Butler is a 65 y.o. female who presents for Medicare Annual Wellness Visit Subsequent, Hyperlipidemia, and Hypothyroidism.  HPI    Patient presents today for AWV,HLD and Hypothyroidism follow up. She does eat a low cholesterol diet. She does not exercise. She does not check her BP at home.  He does take his Synthroid on an empty stomach. Last eye exam was couple years ago. Last dental exam was 3 months ago. No medications changes noted.     Her major complaint today is the pain in her joints, including shoulders and knees. Does stretching exercises. Taking Ibuprofen as needed without relief. Admits to SOB.     Denies alcohol intake. She is a former smoker, 1-2 cigarettes per day.    Due for mammogram.      Review of systems  ; Patient seen today for exam denies any problems with headaches or vision, denies any shortness of breath chest pain nausea or vomiting, no black stool no blood in the stool no heartburn type symptoms denies any problems with constipation or diarrhea, and no dysuria-type symptoms    The patient's allergies medications were reviewed with them today    The patient's social family and surgical history or also reviewed here today, along with her past medical history.     Objective     Alert and active in  no acute distress  HEENT TMs clear oropharynx negative nares clear no drainage noted neck supple  With no adenopathy   Heart regular rate and rhythm without murmur and no carotid bruits  Lungs- clear to auscultation bilaterally, no wheeze or rhonchi noted  Thyroid -negative masses or nodularity  Abdomen- soft times four quadrants , bowel sounds positive no masses or organomegaly, negative tenderness guarding or rebound    skin -no lesions noted      /80 (BP Location: Left arm, Patient Position: Sitting, BP Cuff Size: Adult long)   Pulse 80   Temp 36.6 °C (97.8 °F) (Temporal)   Ht 1.676 m (5' 6\")   Wt 76.2 kg (168 lb)   LMP  (LMP Unknown)   SpO2 95%  "  BMI 27.12 kg/m²         Assessment/Plan   Problem List Items Addressed This Visit       Hyperlipidemia    Hypothyroidism due to Hashimoto's thyroiditis    BMI 27.0-27.9,adult     Other Visit Diagnoses         Medicare annual wellness visit, subsequent    -  Primary      Screening mammogram for breast cancer        Relevant Orders    BI mammo bilateral screening tomosynthesis          Medicare wellness questionnaire reviewed in detail. Advanced Directives reviewed today, Importance of having Advance care planing discussed. Patient advised to provide the office with a copy if has not already done so. No problems with activities of daily living. Falls risks reviewed.     Reviewed most recent lab work from 3/19/25 with patient, showing elevated lipid panel.    Reminded to take statin every day.     Increase water intake and remain hydrated. Advised to increase water intake to 80-84 ounces daily.    Recommended to reduce white bread, pasta, potato, rice, and switch to brown ones.  Take protein and shake, chicken, fish, vegetables in diet.    Recommended to try Turmeric for a month to help with her joint pain.    Mammogram ordered today.     Recommended Shingle vaccine.  She is aware this is a 2-shot series.   She can get this in office or at a drug store.    If anything worsens or changes please call us at once, follow up in the office as planned,       Scribe Attestation  By signing my name below, I, Jemma Gallegos   attest that this documentation has been prepared under the direction and in the presence of Deo Martinez DO.    All medical record entries made by the Scribe were at my direction and personally dictated by me.   I have reviewed the chart and agree that the record accurately reflects my personal performance of the history, physical exam, discussion, and plan.

## 2025-07-10 ENCOUNTER — APPOINTMENT (OUTPATIENT)
Dept: PRIMARY CARE | Facility: CLINIC | Age: 65
End: 2025-07-10
Payer: MEDICARE

## 2025-07-10 VITALS
HEART RATE: 80 BPM | BODY MASS INDEX: 27 KG/M2 | DIASTOLIC BLOOD PRESSURE: 80 MMHG | TEMPERATURE: 97.8 F | HEIGHT: 66 IN | OXYGEN SATURATION: 95 % | WEIGHT: 168 LBS | SYSTOLIC BLOOD PRESSURE: 102 MMHG

## 2025-07-10 DIAGNOSIS — E78.2 MIXED HYPERLIPIDEMIA: ICD-10-CM

## 2025-07-10 DIAGNOSIS — Z00.00 MEDICARE ANNUAL WELLNESS VISIT, SUBSEQUENT: Primary | ICD-10-CM

## 2025-07-10 DIAGNOSIS — E06.3 HYPOTHYROIDISM DUE TO HASHIMOTO'S THYROIDITIS: ICD-10-CM

## 2025-07-10 DIAGNOSIS — Z12.31 SCREENING MAMMOGRAM FOR BREAST CANCER: ICD-10-CM

## 2025-07-10 PROCEDURE — G0439 PPPS, SUBSEQ VISIT: HCPCS | Performed by: FAMILY MEDICINE

## 2025-07-10 PROCEDURE — 3008F BODY MASS INDEX DOCD: CPT | Performed by: FAMILY MEDICINE

## 2025-07-10 PROCEDURE — 1158F ADVNC CARE PLAN TLK DOCD: CPT | Performed by: FAMILY MEDICINE

## 2025-07-10 PROCEDURE — 99213 OFFICE O/P EST LOW 20 MIN: CPT | Performed by: FAMILY MEDICINE

## 2025-07-10 PROCEDURE — 1159F MED LIST DOCD IN RCRD: CPT | Performed by: FAMILY MEDICINE

## 2025-07-10 PROCEDURE — 1123F ACP DISCUSS/DSCN MKR DOCD: CPT | Performed by: FAMILY MEDICINE

## 2025-07-10 PROCEDURE — 1036F TOBACCO NON-USER: CPT | Performed by: FAMILY MEDICINE

## 2025-07-10 PROCEDURE — 1170F FXNL STATUS ASSESSED: CPT | Performed by: FAMILY MEDICINE

## 2025-07-10 ASSESSMENT — ACTIVITIES OF DAILY LIVING (ADL)
DRESSING: INDEPENDENT
BATHING: INDEPENDENT
TAKING_MEDICATION: INDEPENDENT
DOING_HOUSEWORK: INDEPENDENT
MANAGING_FINANCES: INDEPENDENT
GROCERY_SHOPPING: INDEPENDENT

## 2025-07-10 ASSESSMENT — PATIENT HEALTH QUESTIONNAIRE - PHQ9
SUM OF ALL RESPONSES TO PHQ9 QUESTIONS 1 AND 2: 0
1. LITTLE INTEREST OR PLEASURE IN DOING THINGS: NOT AT ALL
2. FEELING DOWN, DEPRESSED OR HOPELESS: NOT AT ALL

## 2025-08-06 ENCOUNTER — TELEPHONE (OUTPATIENT)
Dept: PRIMARY CARE | Facility: CLINIC | Age: 65
End: 2025-08-06
Payer: MEDICARE

## 2025-08-06 DIAGNOSIS — E78.5 DYSLIPIDEMIA: Primary | ICD-10-CM

## 2025-08-11 ENCOUNTER — TELEPHONE (OUTPATIENT)
Dept: PRIMARY CARE | Facility: CLINIC | Age: 65
End: 2025-08-11
Payer: MEDICARE

## 2025-08-11 DIAGNOSIS — E78.5 DYSLIPIDEMIA: Primary | ICD-10-CM

## 2025-09-11 ENCOUNTER — APPOINTMENT (OUTPATIENT)
Dept: RADIOLOGY | Facility: CLINIC | Age: 65
End: 2025-09-11
Payer: MEDICARE